# Patient Record
Sex: FEMALE | Race: WHITE | NOT HISPANIC OR LATINO | Employment: FULL TIME | URBAN - METROPOLITAN AREA
[De-identification: names, ages, dates, MRNs, and addresses within clinical notes are randomized per-mention and may not be internally consistent; named-entity substitution may affect disease eponyms.]

---

## 2018-11-12 ENCOUNTER — RECORDS - HEALTHEAST (OUTPATIENT)
Dept: LAB | Facility: CLINIC | Age: 33
End: 2018-11-12

## 2018-11-14 LAB — BACTERIA SPEC CULT: NORMAL

## 2021-10-27 ENCOUNTER — HOSPITAL ENCOUNTER (EMERGENCY)
Facility: CLINIC | Age: 36
Discharge: HOME | End: 2021-10-27
Attending: EMERGENCY MEDICINE | Admitting: EMERGENCY MEDICINE
Payer: COMMERCIAL

## 2021-10-27 ENCOUNTER — APPOINTMENT (OUTPATIENT)
Dept: CT IMAGING | Facility: CLINIC | Age: 36
End: 2021-10-27
Attending: EMERGENCY MEDICINE
Payer: COMMERCIAL

## 2021-10-27 VITALS
HEIGHT: 65 IN | BODY MASS INDEX: 32.49 KG/M2 | OXYGEN SATURATION: 97 % | WEIGHT: 195 LBS | HEART RATE: 81 BPM | SYSTOLIC BLOOD PRESSURE: 140 MMHG | DIASTOLIC BLOOD PRESSURE: 89 MMHG | TEMPERATURE: 98.2 F | RESPIRATION RATE: 18 BRPM

## 2021-10-27 DIAGNOSIS — N20.1 LEFT URETERAL STONE: ICD-10-CM

## 2021-10-27 DIAGNOSIS — N20.0 CALCULUS OF LEFT KIDNEY: ICD-10-CM

## 2021-10-27 DIAGNOSIS — N23 RENAL COLIC: ICD-10-CM

## 2021-10-27 LAB
ALBUMIN UR-MCNC: 20 MG/DL
ANION GAP SERPL CALCULATED.3IONS-SCNC: 13 MMOL/L (ref 5–18)
APPEARANCE UR: CLEAR
BASOPHILS # BLD AUTO: 0.1 10E3/UL (ref 0–0.2)
BASOPHILS NFR BLD AUTO: 1 %
BILIRUB UR QL STRIP: NEGATIVE
BUN SERPL-MCNC: 19 MG/DL (ref 8–22)
C REACTIVE PROTEIN LHE: 0.4 MG/DL (ref 0–0.8)
CALCIUM SERPL-MCNC: 9.5 MG/DL (ref 8.5–10.5)
CHLORIDE BLD-SCNC: 105 MMOL/L (ref 98–107)
CO2 SERPL-SCNC: 19 MMOL/L (ref 22–31)
COLOR UR AUTO: YELLOW
CREAT SERPL-MCNC: 0.87 MG/DL (ref 0.6–1.1)
EOSINOPHIL # BLD AUTO: 0.2 10E3/UL (ref 0–0.7)
EOSINOPHIL NFR BLD AUTO: 1 %
ERYTHROCYTE [DISTWIDTH] IN BLOOD BY AUTOMATED COUNT: 12.6 % (ref 10–15)
GFR SERPL CREATININE-BSD FRML MDRD: 87 ML/MIN/1.73M2
GLUCOSE BLD-MCNC: 108 MG/DL (ref 70–125)
GLUCOSE UR STRIP-MCNC: NEGATIVE MG/DL
HCG UR QL: NEGATIVE
HCT VFR BLD AUTO: 41.4 % (ref 35–47)
HGB BLD-MCNC: 13.7 G/DL (ref 11.7–15.7)
HGB UR QL STRIP: ABNORMAL
IMM GRANULOCYTES # BLD: 0.1 10E3/UL
IMM GRANULOCYTES NFR BLD: 1 %
KETONES UR STRIP-MCNC: ABNORMAL MG/DL
LEUKOCYTE ESTERASE UR QL STRIP: NEGATIVE
LYMPHOCYTES # BLD AUTO: 1.8 10E3/UL (ref 0.8–5.3)
LYMPHOCYTES NFR BLD AUTO: 11 %
MCH RBC QN AUTO: 26.7 PG (ref 26.5–33)
MCHC RBC AUTO-ENTMCNC: 33.1 G/DL (ref 31.5–36.5)
MCV RBC AUTO: 81 FL (ref 78–100)
MONOCYTES # BLD AUTO: 1.5 10E3/UL (ref 0–1.3)
MONOCYTES NFR BLD AUTO: 9 %
MUCOUS THREADS #/AREA URNS LPF: PRESENT /LPF
NEUTROPHILS # BLD AUTO: 13.6 10E3/UL (ref 1.6–8.3)
NEUTROPHILS NFR BLD AUTO: 77 %
NITRATE UR QL: NEGATIVE
NRBC # BLD AUTO: 0 10E3/UL
NRBC BLD AUTO-RTO: 0 /100
PH UR STRIP: 5.5 [PH] (ref 5–7)
PLATELET # BLD AUTO: 343 10E3/UL (ref 150–450)
POTASSIUM BLD-SCNC: 4.1 MMOL/L (ref 3.5–5)
RBC # BLD AUTO: 5.13 10E6/UL (ref 3.8–5.2)
RBC URINE: 108 /HPF
SARS-COV-2 RNA RESP QL NAA+PROBE: NEGATIVE
SODIUM SERPL-SCNC: 137 MMOL/L (ref 136–145)
SP GR UR STRIP: 1.03 (ref 1–1.03)
SQUAMOUS EPITHELIAL: 1 /HPF
UROBILINOGEN UR STRIP-MCNC: <2 MG/DL
WBC # BLD AUTO: 17.3 10E3/UL (ref 4–11)
WBC URINE: 3 /HPF

## 2021-10-27 PROCEDURE — 96375 TX/PRO/DX INJ NEW DRUG ADDON: CPT | Mod: 59

## 2021-10-27 PROCEDURE — 81003 URINALYSIS AUTO W/O SCOPE: CPT | Performed by: EMERGENCY MEDICINE

## 2021-10-27 PROCEDURE — 99285 EMERGENCY DEPT VISIT HI MDM: CPT | Mod: 25

## 2021-10-27 PROCEDURE — 36415 COLL VENOUS BLD VENIPUNCTURE: CPT | Performed by: EMERGENCY MEDICINE

## 2021-10-27 PROCEDURE — 96374 THER/PROPH/DIAG INJ IV PUSH: CPT | Mod: 59

## 2021-10-27 PROCEDURE — 85025 COMPLETE CBC W/AUTO DIFF WBC: CPT | Performed by: EMERGENCY MEDICINE

## 2021-10-27 PROCEDURE — 81025 URINE PREGNANCY TEST: CPT | Performed by: PHYSICIAN ASSISTANT

## 2021-10-27 PROCEDURE — 86141 C-REACTIVE PROTEIN HS: CPT | Performed by: EMERGENCY MEDICINE

## 2021-10-27 PROCEDURE — 250N000011 HC RX IP 250 OP 636: Performed by: EMERGENCY MEDICINE

## 2021-10-27 PROCEDURE — 81001 URINALYSIS AUTO W/SCOPE: CPT | Performed by: EMERGENCY MEDICINE

## 2021-10-27 PROCEDURE — 74177 CT ABD & PELVIS W/CONTRAST: CPT

## 2021-10-27 PROCEDURE — 96376 TX/PRO/DX INJ SAME DRUG ADON: CPT | Mod: 59

## 2021-10-27 PROCEDURE — 80048 BASIC METABOLIC PNL TOTAL CA: CPT | Performed by: EMERGENCY MEDICINE

## 2021-10-27 PROCEDURE — 87635 SARS-COV-2 COVID-19 AMP PRB: CPT | Performed by: FAMILY MEDICINE

## 2021-10-27 PROCEDURE — C9803 HOPD COVID-19 SPEC COLLECT: HCPCS

## 2021-10-27 PROCEDURE — 250N000013 HC RX MED GY IP 250 OP 250 PS 637: Performed by: FAMILY MEDICINE

## 2021-10-27 RX ORDER — PRENATAL VIT/IRON FUM/FOLIC AC 27MG-0.8MG
1 TABLET ORAL EVERY EVENING
COMMUNITY

## 2021-10-27 RX ORDER — HYDROMORPHONE HYDROCHLORIDE 1 MG/ML
0.5 INJECTION, SOLUTION INTRAMUSCULAR; INTRAVENOUS; SUBCUTANEOUS ONCE
Status: COMPLETED | OUTPATIENT
Start: 2021-10-27 | End: 2021-10-27

## 2021-10-27 RX ORDER — IBUPROFEN 200 MG
400 TABLET ORAL EVERY 6 HOURS
Qty: 56 TABLET | Refills: 0 | Status: SHIPPED | OUTPATIENT
Start: 2021-10-27 | End: 2021-11-03

## 2021-10-27 RX ORDER — CETIRIZINE HYDROCHLORIDE 10 MG/1
10 TABLET ORAL EVERY EVENING
COMMUNITY

## 2021-10-27 RX ORDER — OXYCODONE HYDROCHLORIDE 5 MG/1
5 TABLET ORAL EVERY 4 HOURS PRN
Qty: 8 TABLET | Refills: 0 | Status: SHIPPED | OUTPATIENT
Start: 2021-10-27 | End: 2021-10-31

## 2021-10-27 RX ORDER — ONDANSETRON 4 MG/1
4 TABLET, ORALLY DISINTEGRATING ORAL EVERY 6 HOURS PRN
Qty: 12 TABLET | Refills: 0 | Status: SHIPPED | OUTPATIENT
Start: 2021-10-27 | End: 2021-10-30

## 2021-10-27 RX ORDER — ESCITALOPRAM OXALATE 20 MG/1
20 TABLET ORAL EVERY EVENING
COMMUNITY

## 2021-10-27 RX ORDER — IOPAMIDOL 755 MG/ML
100 INJECTION, SOLUTION INTRAVASCULAR ONCE
Status: COMPLETED | OUTPATIENT
Start: 2021-10-27 | End: 2021-10-27

## 2021-10-27 RX ORDER — OXYCODONE HYDROCHLORIDE 5 MG/1
5 TABLET ORAL ONCE
Status: COMPLETED | OUTPATIENT
Start: 2021-10-27 | End: 2021-10-27

## 2021-10-27 RX ORDER — ACETAMINOPHEN 500 MG
1000 TABLET ORAL EVERY 6 HOURS
Qty: 56 TABLET | Refills: 0 | Status: SHIPPED | OUTPATIENT
Start: 2021-10-27 | End: 2021-11-03

## 2021-10-27 RX ORDER — KETOROLAC TROMETHAMINE 15 MG/ML
15 INJECTION, SOLUTION INTRAMUSCULAR; INTRAVENOUS ONCE
Status: COMPLETED | OUTPATIENT
Start: 2021-10-27 | End: 2021-10-27

## 2021-10-27 RX ADMIN — HYDROMORPHONE HYDROCHLORIDE 0.5 MG: 1 INJECTION, SOLUTION INTRAMUSCULAR; INTRAVENOUS; SUBCUTANEOUS at 12:41

## 2021-10-27 RX ADMIN — IOPAMIDOL 100 ML: 755 INJECTION, SOLUTION INTRAVENOUS at 13:37

## 2021-10-27 RX ADMIN — OXYCODONE HYDROCHLORIDE 5 MG: 5 TABLET ORAL at 15:49

## 2021-10-27 RX ADMIN — HYDROMORPHONE HYDROCHLORIDE 0.5 MG: 1 INJECTION, SOLUTION INTRAMUSCULAR; INTRAVENOUS; SUBCUTANEOUS at 14:37

## 2021-10-27 RX ADMIN — KETOROLAC TROMETHAMINE 15 MG: 15 INJECTION, SOLUTION INTRAMUSCULAR; INTRAVENOUS at 12:41

## 2021-10-27 ASSESSMENT — ENCOUNTER SYMPTOMS
DYSURIA: 0
ABDOMINAL PAIN: 1
COUGH: 1
CHILLS: 0
EYES NEGATIVE: 1
SHORTNESS OF BREATH: 0
HEADACHES: 0
FEVER: 0
HEMATURIA: 0
BACK PAIN: 1

## 2021-10-27 ASSESSMENT — MIFFLIN-ST. JEOR: SCORE: 1585.39

## 2021-10-27 NOTE — ED PROVIDER NOTES
EMERGENCY DEPARTMENT ENCOUNTER      NAME: Clementine Noel  AGE: 36 year old female  YOB: 1985  MRN: [id number]  EVALUATION DATE & TIME: 10/27/2021 12:05 PM    PCP: No primary care provider on file.    ED PROVIDER: Miroslava Kate M.D.      CHIEF COMPLAINT     Chief Complaint   Patient presents with     Abdominal Pain       FINAL IMPRESSION:     1. Renal colic    2. Left ureteral stone    3. Calculus of left kidney          MEDICAL DECISION MAKING:     Pertinent Labs & Imaging studies reviewed. (See chart for details)    36 year old female presents to the Emergency Department for evaluation of left sided abdominal pain    She noticed pain about 2 days ago but it went away and today the pain has been constant sometimes it radiates to mid back nothing makes it better nothing makes it worse.  Denies any urinary symptoms never had this pain before.    Called the clinic the daughter to come to the emergency department make sure she does not have an ectopic pregnancy or diverticulitis.    She otherwise denies any constitutional symptoms.    On examination she appears uncomfortable laying in bed her  is with her.  Cardiopulmonary no acute normalities abdominal examination no guarding no rebound unable to reproduce pain.    Differential diagnoses include ectopic diverticulitis perforated viscus left-sided appendicitis kidney stone pyelonephritis among others.    Received Dilaudid for pain and Toradol on repeat evaluation she states she felt much better pregnancy test negative elevated white blood cell count urine with negative nitrates leukocyte esterase but positive red blood cells.    CT does reveal 6 time 4 cm left ureteral stone.    CRP normal.  Call placed to [facility].  Patient reevaluated and updated think pain is better was given another dose of morphine.    ED Course as of Oct 27 1527   Wed Oct 27, 2021   1523 Chloride: 105         Differential Diagnosis (include but not limited to)  Renal colic  appendicitis diverticulitis ectopic perforated viscus dissection among others      Vital Signs: reviewed  EKG: none  Imaging: Reviewed CT 6 x 4 mm left ureteral stone  Home Meds:  ED meds/abx: Toradol Zofran  Fluids: NS    Labs  K 4.1  Cr .87  Wbc 17.3  Hgb 13.7  Platelets 343  CRP 0.4      Review of Previous Records        Consults  [facility]    ED COURSE     12:26 PM I met with the patient for the initial interview and physical examination. Discussed plan for treatment and workup in the ED.      2:02 PM elevated and updated appears much more comfortable.  Pain is 6 out of 10.  Will get another dose of Dilaudid. notified of the 6 x 4 renal stone.      At the conclusion of the encounter I discussed the results of all of the tests and the disposition. The questions were answered. The patient and her  acknowledged understanding and was agreeable with the care plan.           MEDICATIONS GIVEN IN THE EMERGENCY:     Medications   HYDROmorphone (PF) (DILAUDID) injection 0.5 mg (0.5 mg Intravenous Given 10/27/21 1241)   ketorolac (TORADOL) injection 15 mg (15 mg Intravenous Given 10/27/21 1241)   iopamidol (ISOVUE-370) solution 100 mL (100 mLs Intravenous Given 10/27/21 1337)   HYDROmorphone (PF) (DILAUDID) injection 0.5 mg (0.5 mg Intravenous Given 10/27/21 1437)       NEW PRESCRIPTIONS STARTED AT TODAY'S ER VISIT     New Prescriptions    No medications on file     =================================================================    HPI     Patient information was obtained from: Patient    Patient is accompanied by her .     Use of : N/A        Clementine Noel is a 36 year old female with a pertinent medical history of transverse colon benign neoplasm, colorectal polyps, and dyspepsia who presents by walk-in for evaluation of abdominal pain.     The patient reports onset of mild abdominal pain a couple of days ago that is now constant, sharp pain in the LLQ of her abdomen that radiates somewhat to  her back. Patient's  notes that patient has had a cough for the past couple of weeks as well. No concern for pregnancy.     Patient denies dysuria, hematuria, vaginal bleeding, fever, chills, headaches, problems with his eyes, ears, nose, mouth/throat, chest pain, shortness of breath and any other symptoms or complaints at this time.       REVIEW OF SYSTEMS   Review of Systems   Constitutional: Negative for chills and fever.   HENT: Negative.    Eyes: Negative.    Respiratory: Positive for cough. Negative for shortness of breath.    Cardiovascular: Negative for chest pain.   Gastrointestinal: Positive for abdominal pain (LLQ).   Genitourinary: Negative for dysuria, hematuria and vaginal bleeding.   Musculoskeletal: Positive for back pain (radiates from LLQ abdominal pain).   Neurological: Negative for headaches.   All other systems reviewed and are negative.       PAST MEDICAL HISTORY:   No past medical history on file.    PAST SURGICAL HISTORY:   No past surgical history on file.    CURRENT MEDICATIONS:   HYDROcodone-acetaminophen 5-325 mg per tablet       ALLERGIES:     Allergies   Allergen Reactions     Penicillins Rash     States has taken augmentin without problem       FAMILY HISTORY:   No family history on file.    SOCIAL HISTORY:     Social History     Socioeconomic History     Marital status:      Spouse name: Not on file     Number of children: Not on file     Years of education: Not on file     Highest education level: Not on file   Occupational History     Not on file   Tobacco Use     Smoking status: Not on file   Substance and Sexual Activity     Alcohol use: Not on file     Drug use: Not on file     Sexual activity: Not on file   Other Topics Concern     Not on file   Social History Narrative     Not on file     Social Determinants of Health     Financial Resource Strain:      Difficulty of Paying Living Expenses:    Food Insecurity:      Worried About Running Out of Food in the Last  "Year:      Ran Out of Food in the Last Year:    Transportation Needs:      Lack of Transportation (Medical):      Lack of Transportation (Non-Medical):    Physical Activity:      Days of Exercise per Week:      Minutes of Exercise per Session:    Stress:      Feeling of Stress :    Social Connections:      Frequency of Communication with Friends and Family:      Frequency of Social Gatherings with Friends and Family:      Attends Zoroastrianism Services:      Active Member of Clubs or Organizations:      Attends Club or Organization Meetings:      Marital Status:    Intimate Partner Violence:      Fear of Current or Ex-Partner:      Emotionally Abused:      Physically Abused:      Sexually Abused:        VITALS:   /80   Pulse 98   Temp 98.8  F (37.1  C) (Oral)   Resp 17   Ht 1.651 m (5' 5\")   Wt 88.5 kg (195 lb)   LMP 09/15/2020 (Within Weeks)   SpO2 99%   Breastfeeding Yes   BMI 32.45 kg/m      PHYSICAL EXAM     Physical Exam  Vitals and nursing note reviewed. Exam conducted with a chaperone present.   Constitutional:       General: She is in acute distress.      Appearance: She is well-developed and normal weight. She is not ill-appearing, toxic-appearing or diaphoretic.   Cardiovascular:      Rate and Rhythm: Normal rate.   Pulmonary:      Breath sounds: Normal breath sounds.   Abdominal:      General: Abdomen is flat.      Palpations: Abdomen is soft. There is no pulsatile mass.      Tenderness: There is abdominal tenderness in the left lower quadrant. There is no guarding or rebound.   Genitourinary:     Vagina: No tenderness.      Cervix: No cervical motion tenderness.   Skin:     General: Skin is warm.      Capillary Refill: Capillary refill takes less than 2 seconds.   Neurological:      General: No focal deficit present.      Mental Status: She is alert and oriented to person, place, and time.         Physical Exam   Constitutional: Uncomfortable appearing.     Head: Atraumatic.     Nose: Nose " normal.     Mouth/Throat: Oropharynx is clear and moist.     Eyes: EOM are normal. Pupils are equal, round, and reactive to light.     Ears: Bilateral pearly white.     Neck: Normal range of motion. Neck supple.     Cardiovascular: Normal rate, regular rhythm and normal heart sounds.      Pulmonary/Chest: Normal effort  and breath sounds normal.     Abdominal: Soft.  No pain in the left lower quadrant mild tenderness palpation no guarding no rebound    Musculoskeletal: Normal range of motion.     Neurological: No deficit.     Lymphatics: No edema.     Skin: Skin is warm and dry.     Psychiatric: Normal mood and affect. Behavior is normal.       LAB:     All pertinent labs reviewed and interpreted.  Labs Ordered and Resulted from Time of ED Arrival to Time of ED Departure   ROUTINE UA WITH MICROSCOPIC REFLEX TO CULTURE - Abnormal       Result Value    Color Urine Yellow      Appearance Urine Clear      Glucose Urine Negative      Bilirubin Urine Negative      Ketones Urine Trace (*)     Specific Gravity Urine 1.034 (*)     Blood Urine 0.2 mg/dL (*)     pH Urine 5.5      Protein Albumin Urine 20  (*)     Urobilinogen Urine <2.0      Nitrite Urine Negative      Leukocyte Esterase Urine Negative      Mucus Urine Present (*)     RBC Urine 108 (*)     WBC Urine 3      Squamous Epithelials Urine 1     BASIC METABOLIC PANEL - Abnormal    Sodium 137      Potassium 4.1      Chloride 105      Carbon Dioxide (CO2) 19 (*)     Anion Gap 13      Urea Nitrogen 19      Creatinine 0.87      Calcium 9.5      Glucose 108      GFR Estimate 87     CBC WITH PLATELETS AND DIFFERENTIAL - Abnormal    WBC Count 17.3 (*)     RBC Count 5.13      Hemoglobin 13.7      Hematocrit 41.4      MCV 81      MCH 26.7      MCHC 33.1      RDW 12.6      Platelet Count 343      % Neutrophils 77      % Lymphocytes 11      % Monocytes 9      % Eosinophils 1      % Basophils 1      % Immature Granulocytes 1      NRBCs per 100 WBC 0      Absolute Neutrophils  13.6 (*)     Absolute Lymphocytes 1.8      Absolute Monocytes 1.5 (*)     Absolute Eosinophils 0.2      Absolute Basophils 0.1      Absolute Immature Granulocytes 0.1 (*)     Absolute NRBCs 0.0     HCG QUALITATIVE URINE - Normal    hCG Urine Qualitative Negative     CRP INFLAMMATION - Normal    CRP 0.4     COVID-19 VIRUS (CORONAVIRUS) BY PCR        RADIOLOGY:     Reviewed all pertinent imaging. Please see official radiology report.  CT Abdomen Pelvis w Contrast   Final Result   IMPRESSION:    1.  Mild to moderately obstructing 6 x 4 mm stone distal left ureter at the ureterovesical junction.      2.  Additional 3 tiny 1 mm nonobstructing stones lower pole left kidney.      3.  No stones on the right side.           EKG:       I have independently reviewed and interpreted the EKG(s) documented above.      PROCEDURES:     Procedures      I, Dorcas Segundo, am serving as a scribe to document services personally performed by Dr. Kate based on my observation and the provider's statements to me. I, Miroslava Kate MD attest that Dorcas Segundo is acting in a scribe capacity, has observed my performance of the services and has documented them in accordance with my direction.    Miroslava Kate M.D.  Emergency Medicine  [facility] EMERGENCY ROOM  [address] [facility] [id number] [phone]  Dept: [phone]        Miroslava Kate MD  10/27/21 1495

## 2021-10-27 NOTE — DISCHARGE INSTRUCTIONS
Do not eat or drink after midnight than sips of water with medications    [facility] clinic will call you tomorrow for follow-up. If you not hear from them by 10 AM, call the clinic at the number above.

## 2021-10-27 NOTE — Clinical Note
Clementine Noel was seen and treated in our emergency department on 10/27/2021.  She may return to work on 10/29/2021.       If you have any questions or concerns, please don't hesitate to call.      Clifton Abrams MD

## 2021-10-27 NOTE — PHARMACY-ADMISSION MEDICATION HISTORY
Pharmacy Note - Admission Medication History    Pertinent Provider Information: None. ______________________________________________________________________    Prior To Admission (PTA) med list completed and updated in EMR.       PTA Med List   Medication Sig Last Dose     cetirizine (ZYRTEC) 10 MG tablet Take 10 mg by mouth every evening 10/26/2021 at Unknown time     escitalopram (LEXAPRO) 20 MG tablet Take 20 mg by mouth every evening 10/26/2021 at Unknown time     Prenatal Vit-Fe Fumarate-FA (PRENATAL MULTIVITAMIN W/IRON) 27-0.8 MG tablet Take 1 tablet by mouth every evening 10/26/2021 at Unknown time     Information source(s): Patient and CareEverywhere/SureScripts  Method of interview communication: in-person    Summary of Changes to PTA Med List  New: None  Discontinued: None  Changed: None    Patient was asked about OTC/herbal products specifically.  PTA med list reflects this.    In the past week, patient estimated taking medication this percent of the time:  greater than 90%.    Allergies were reviewed, assessed, and updated with the patient.      Patient does not use any multi-dose medications prior to admission.    The information provided in this note is only as accurate as the sources available at the time of the update(s).    Thank you for the opportunity to participate in the care of this patient.    Emerita Christine, PharmD, BCPS  10/27/2021 4:25 PM

## 2021-10-27 NOTE — ED PROVIDER NOTES
"ED SIGNOUT  Date/Time:10/27/2021 2:29 PM    ED Course/MDM:    2:29 PM Signout accepted from Dr. Miroslava Kate.  Prior records were reviewed.  Labs, x-ray, CT, EKG from this visit are reviewed.    3:03 PM Spoke with Dr. Goyal ***  3:32 PM Introduced myself to the patient.   4:42 PM Checked in on and updated patient. She reports being pain free and agreeable with discharge.    At the conclusion of the encounter I discussed  the results of all of the tests and the disposition with ***.   All questions were answered.  The *** acknowledged understanding and was involved in the decision making regarding the overall care plan.     I discussed with patient the utility, limitations and findings of the exam/interventions/studies done during this visit as well as the list of differential diagnosis and symptoms to monitor/return to ER for.  Additional verbal discharge instructions were provided.     DIAGNOSIS  No diagnosis found.    New Prescriptions    No medications on file       HPI    Briefly, this is a 36 year old female who presents for evaluation of constant, sharp LLQ abdominal pain. Reports that mild abdominal pain started a couple days ago but has now worsened and occasionally radiates to her back. Patient's  notes that patient has had a cough for the past couple of weeks as well. No concern for pregnancy.     Physical Exam:  /80   Pulse 98   Temp 98.8  F (37.1  C) (Oral)   Resp 17   Ht 1.651 m (5' 5\")   Wt 88.5 kg (195 lb)   LMP 09/15/2020 (Within Weeks)   SpO2 99%   Breastfeeding Yes   BMI 32.45 kg/m    Physical Exam ***     Results for orders placed or performed during the hospital encounter of 10/27/21   CT Abdomen Pelvis w Contrast    Impression    IMPRESSION:   1.  Mild to moderately obstructing 6 x 4 mm stone distal left ureter at the ureterovesical junction.    2.  Additional 3 tiny 1 mm nonobstructing stones lower pole left kidney.    3.  No stones on the right side.   UA with " Microscopic reflex to Culture    Specimen: Urine, Clean Catch   Result Value Ref Range    Color Urine Yellow Colorless, Straw, Light Yellow, Yellow    Appearance Urine Clear Clear    Glucose Urine Negative Negative mg/dL    Bilirubin Urine Negative Negative    Ketones Urine Trace (A) Negative mg/dL    Specific Gravity Urine 1.034 (H) 1.001 - 1.030    Blood Urine 0.2 mg/dL (A) Negative    pH Urine 5.5 5.0 - 7.0    Protein Albumin Urine 20  (A) Negative mg/dL    Urobilinogen Urine <2.0 <2.0 mg/dL    Nitrite Urine Negative Negative    Leukocyte Esterase Urine Negative Negative    Mucus Urine Present (A) None Seen /LPF    RBC Urine 108 (H) <=2 /HPF    WBC Urine 3 <=5 /HPF    Squamous Epithelials Urine 1 <=1 /HPF   HCG qualitative urine   Result Value Ref Range    hCG Urine Qualitative Negative Negative   Basic metabolic panel   Result Value Ref Range    Sodium 137 136 - 145 mmol/L    Potassium 4.1 3.5 - 5.0 mmol/L    Chloride 105 98 - 107 mmol/L    Carbon Dioxide (CO2) 19 (L) 22 - 31 mmol/L    Anion Gap 13 5 - 18 mmol/L    Urea Nitrogen 19 8 - 22 mg/dL    Creatinine 0.87 0.60 - 1.10 mg/dL    Calcium 9.5 8.5 - 10.5 mg/dL    Glucose 108 70 - 125 mg/dL    GFR Estimate 87 >60 mL/min/1.73m2   CBC with platelets and differential   Result Value Ref Range    WBC Count 17.3 (H) 4.0 - 11.0 10e3/uL    RBC Count 5.13 3.80 - 5.20 10e6/uL    Hemoglobin 13.7 11.7 - 15.7 g/dL    Hematocrit 41.4 35.0 - 47.0 %    MCV 81 78 - 100 fL    MCH 26.7 26.5 - 33.0 pg    MCHC 33.1 31.5 - 36.5 g/dL    RDW 12.6 10.0 - 15.0 %    Platelet Count 343 150 - 450 10e3/uL    % Neutrophils 77 %    % Lymphocytes 11 %    % Monocytes 9 %    % Eosinophils 1 %    % Basophils 1 %    % Immature Granulocytes 1 %    NRBCs per 100 WBC 0 <1 /100    Absolute Neutrophils 13.6 (H) 1.6 - 8.3 10e3/uL    Absolute Lymphocytes 1.8 0.8 - 5.3 10e3/uL    Absolute Monocytes 1.5 (H) 0.0 - 1.3 10e3/uL    Absolute Eosinophils 0.2 0.0 - 0.7 10e3/uL    Absolute Basophils 0.1 0.0 - 0.2  10e3/uL    Absolute Immature Granulocytes 0.1 (H) <=0.0 10e3/uL    Absolute NRBCs 0.0 10e3/uL       CT Abdomen Pelvis w Contrast    Result Date: 10/27/2021  EXAM: CT ABDOMEN PELVIS W CONTRAST LOCATION: [facility] DATE/TIME: 10/27/2021 1:33 PM INDICATION: Flank pain, stone disease suspected - left COMPARISON: None. TECHNIQUE: CT scan of the abdomen and pelvis was performed following injection of IV contrast. Multiplanar reformats were obtained. Dose reduction techniques were used. CONTRAST: Isovue-370 100mL FINDINGS: LOWER CHEST: Normal. HEPATOBILIARY: Normal. PANCREAS: Normal. SPLEEN: Normal. ADRENAL GLANDS: Normal. KIDNEYS/BLADDER: 6 x 4 mm mild to moderately obstructing stone distal left ureter at ureterovesical junction. Additional 3 tiny 1 mm nonobstructing stones lower pole left kidney. No stones on the right side. BOWEL: Normal. LYMPH NODES: Normal. VASCULATURE: Unremarkable. PELVIC ORGANS: Normal. MUSCULOSKELETAL: Normal.     IMPRESSION: 1.  Mild to moderately obstructing 6 x 4 mm stone distal left ureter at the ureterovesical junction. 2.  Additional 3 tiny 1 mm nonobstructing stones lower pole left kidney. 3.  No stones on the right side.      Clifton Abrams M.D.  [facility] Emergency Department

## 2021-10-27 NOTE — ED TRIAGE NOTES
Pt. Reports acute onset of severe LLQ pain this morning, with some nausea. Pt. Very uncomfortable/restless d/t pain currently. Pt. Seen in urgent care, but sent to ED for further evaluation of possible ectopic vs diverticulitis.

## 2021-10-28 ENCOUNTER — TELEPHONE (OUTPATIENT)
Dept: UROLOGY | Facility: CLINIC | Age: 36
End: 2021-10-28

## 2021-10-28 ENCOUNTER — VIRTUAL VISIT (OUTPATIENT)
Dept: UROLOGY | Facility: CLINIC | Age: 36
End: 2021-10-28
Payer: COMMERCIAL

## 2021-10-28 DIAGNOSIS — N20.1 LEFT URETERAL STONE: ICD-10-CM

## 2021-10-28 DIAGNOSIS — N20.1 CALCULUS OF URETER: Primary | ICD-10-CM

## 2021-10-28 PROCEDURE — 99204 OFFICE O/P NEW MOD 45 MIN: CPT | Mod: 95 | Performed by: UROLOGY

## 2021-10-28 ASSESSMENT — PAIN SCALES - GENERAL: PAINLEVEL: NO PAIN (1)

## 2021-10-28 NOTE — TELEPHONE ENCOUNTER
Message left for patient to call clinic to set up an appointment for kidney stone follow-up for today or tomorrow.  Venus Hobbs RN

## 2021-10-28 NOTE — PATIENT INSTRUCTIONS
Patient Stated Goal: Pass my stone  Symptom Control While Passing A Stone    The goal of [facility] is to let a smaller kidney stone (less than 4 to 5 mm) pass without intervention if possible. Giving your body a chance to clear the stone may take a few hours up to a few weeks.  Keeping you well-informed, safe and fairly comfortable is important.    Drink to thirst  Do not attempt to  flush out  your stone by drinking too much fluid. This does not work and may increase nausea. Drink enough to satisfy your body s thirst. Eating your normal diet is fine.   Medications (that may be suggested or prescribed)    Ibuprofen (Advil or Motrin) Available over the counter  o Take two (200mg) tablets every six hours until the stone passes.  o Prevents spasm of the ureter.    o Decreases pain.      Dramamine* (drowsy version, non-generic formulation) Available over the counter  o Take 50mg at bedtime  o Decreases spasms of the ureter  o Decreases nausea  o Decreases acute pain  o Decreases recurrence of pain for 24 hours  o Will help you sleep  *This medication will cause increase drowsiness, do not drive or operate machinery for 6 hours.      Narcotics (Percocet, Vicodin, Dilaudid) Take as prescribed for severe pain unrelieved by ibuprofen and Dramamine  o Narcotics have significant side effects and only  cover-up  pain. They have no effect on the cause of pain.  o Common side effects  - Confusion, disorientation and sedation - DO NOT DRIVE OR OPERATE MACHINERY WITHIN 24 HOURS  - Nausea - take Dramamine or Zofran or Haldol to help control  - Constipation  - Sleep disturbances      Ondansetron (Zofran) Take as prescribed  o Reserve for severe nausea  o May cause constipation, start over the counter Miralax if needed      Second Line Anti-Nausea Medication: Adding a different anti-nausea medication maybe helpful for persistent nausea.  The combined effect of different types of anti-nausea medications maybe more  effective than either medication by itself, even in higher doses.  o Compazine: Take as prescribed      Information about kidney stones    Crystals can form if chemicals are too concentrated in your urine. If the crystal grows over time, a stone may form. A stone usually isn t painful while it is still in the kidney.    As the stone begins to leave the kidney, you may experience episodes of flank pain as the kidney stone approaches the entrance to the ureter. Some people feel a vague ache in the side.    Kidney stones may fall into the ureter. Some stones are tiny and pass through without causing symptoms. The ureter is a small tube (approximately 1/8 of an inch wide). A kidney stone can get stuck and block the ureter. If this happens, urine backs up and flows back to the kidney. Back pressure on the kidney can cause:  o Severe flank pain radiating to the groin.  o Severe nausea and vomiting.  o The pain can occur in the lower back, side, groin or all three.      When the stone reaches the lower ureter, this can irritate the bladder and sensations of feeling the urge to urinate frequently and urgently may occur.      Once the stone passes out of your ureter and into your bladder, the symptoms of urgency and frequency will often disappear. Sometimes pain will come back for a short period and will not be as severe as before. The passage of the stone from your bladder and out of your body is usually not a problem. The urethra is at least twice as wide as the normal ureter, so the stone doesn t usually block it.    Strain all urine  If you pass the stone, save it. Place it in the container we have provided and bring it to the [facility] within a week of passing it. Your stone will then be sent for analysis which takes about a month.     Signs and symptoms you might experience    Nausea    Decreased appetite    Urinary frequency    Bloody urine     Chills    Fatigue    When to call [facility] or  go to the Emergency Room    Fever with a temperature greater than 100.1    Severe pain    Persistent nausea/vomiting    If the pain worsens or nausea/vomiting is uncontrolled with medications, STOP eating & drinking. You need to have an empty stomach for 8 hours prior to surgery. Call the [facility] immediately at [phone].           Follow-up    Low dose CT scan with doctor visit 1-2 weeks after initial clinic visit per doctor s instructions    Please cancel the CT scan visit if you pass a stone. Reschedule for a one month follow-up with doctor to discuss stone composition and future prevention.    Preventing future stones    Approximately a month after your stone is sent out for analysis, a prevention visit will occur with your provider, to discuss an individualized plan for prevention of new stones and to discuss managing stones that you may still have. Along with the analysis of the kidney stone, blood and urine tests may be indicated to develop this plan. Knowing the type of kidney stones you make, and why, allows the providers at the [facility] to recommend specific ways to prevent them.    Follow-up visits are an important part of monitoring and preventing future re-occurrences.    The [facility] is available for questions or concerns 24 hours a day at [phone]

## 2021-10-28 NOTE — PROGRESS NOTES
Assessment/Plan:    Assessment & Plan   Diagnoses and all orders for this visit:    Calculus of ureter  -     Patient Stated Goal: Pass my stone  -     CT Abdomen Pelvis w/o Contrast; Future        Stone Management Plan  Stone Management 10/28/2021   Urinary Tract Infection No suspicion of infection   Renal Colic Well controlled symptoms   Renal Failure No suspicion of renal failure   Current CT date 10/27/2021   Right sided stones? No   R Stone Event No current event   Left sided stones? Yes   L Number of ureteral stones 1   L GSD of ureteral stones 6   L Location of ureteral stone Distal   L Number of kidney stones  3   L GSD of kidney stones < 2   L Hydronephrosis Mild   L Stone Event New event   Diagnosis date 10/27/2021   Initial location of primary symptomatic stone Distal   Initial GSD of primary symptomatic stone 6   L MET Status Initiation   L Current Plan MET   MET 2 week F/U             PLAN    Will proceed with medical expulsive therapy. Risks and benefits were detailed of medical expulsive therapy including probability of stone passage, recurrent renal colic, and requirement of emergency medical and/or surgical care and further imaging. Patient verbalized understanding. Patient agrees with plan as discussed. She will return in 2 weeks with low dose CT scan.    Over the counter symptom control medications of ibuprofen, Dramamine and Tylenol were recommended.    Phone call duration: 10 minutes  15 minutes spent on the date of the encounter doing chart review, history and exam, documentation and further activities per the note    SARAH SOLIS MD  [facility]    Subjective:     HPI  Ms. Clementine Noel is a 36 year old  female who is being evaluated via a billable video visit by [facility] [place] new stone episode.    She is a first time unidentified composition stone former who has not required stone clearance procedures. She has  not previously participated in stone risk evaluation. She has no identified modifiable stone risk factors. She has identified non-modifiable stone risks including:  limited family history and multiple stones at presentation.    She was into ED yesterday with severe left flank pain. Good pain control since. No fever or chills. Denies voiding symptoms. Had some left sided cramping a couple of days earlier. Several maternal aunts have had stone issues.    CT scan is personally reviewed and demonstrates a 6 mm left distal stone and ~3 left lower pole papillary tip calcifications.    Significant labs from presentation include elevated WBC and normal C reactive protein.    ROS   Review of systems is negative except for HPI    No past medical history on file.    No past surgical history on file.    Current Outpatient Medications   Medication Sig Dispense Refill     acetaminophen (TYLENOL) 500 MG tablet Take 2 tablets (1,000 mg) by mouth every 6 hours for 7 days 56 tablet 0     cetirizine (ZYRTEC) 10 MG tablet Take 10 mg by mouth every evening       escitalopram (LEXAPRO) 20 MG tablet Take 20 mg by mouth every evening       ibuprofen (ADVIL/MOTRIN) 200 MG tablet Take 2 tablets (400 mg) by mouth every 6 hours for 7 days 56 tablet 0     ondansetron (ZOFRAN-ODT) 4 MG ODT tab Take 1 tablet (4 mg) by mouth every 6 hours as needed for nausea 12 tablet 0     oxyCODONE (ROXICODONE) 5 MG tablet Take 1 tablet (5 mg) by mouth every 4 hours as needed for severe pain If pain is not improved with acetaminophen and ibuprofen. 8 tablet 0     Prenatal Vit-Fe Fumarate-FA (PRENATAL MULTIVITAMIN W/IRON) 27-0.8 MG tablet Take 1 tablet by mouth every evening         Allergies   Allergen Reactions     Penicillins Rash     Rash was as a child. States has taken augmentin without problem.       Social History     Socioeconomic History     Marital status:      Spouse name: Not on file     Number of children: Not on file     Years of education:  Not on file     Highest education level: Not on file   Occupational History     Not on file   Tobacco Use     Smoking status: Not on file   Substance and Sexual Activity     Alcohol use: Not on file     Drug use: Not on file     Sexual activity: Not on file   Other Topics Concern     Not on file   Social History Narrative     Not on file     Social Determinants of Health     Financial Resource Strain:      Difficulty of Paying Living Expenses:    Food Insecurity:      Worried About Running Out of Food in the Last Year:      Ran Out of Food in the Last Year:    Transportation Needs:      Lack of Transportation (Medical):      Lack of Transportation (Non-Medical):    Physical Activity:      Days of Exercise per Week:      Minutes of Exercise per Session:    Stress:      Feeling of Stress :    Social Connections:      Frequency of Communication with Friends and Family:      Frequency of Social Gatherings with Friends and Family:      Attends Caodaism Services:      Active Member of Clubs or Organizations:      Attends Club or Organization Meetings:      Marital Status:    Intimate Partner Violence:      Fear of Current or Ex-Partner:      Emotionally Abused:      Physically Abused:      Sexually Abused:        No family history on file.    Objective:     No vitals or physical exam obtained due to virtual visit    Labs    Urinalysis POC (Office):  Nitrite Urine   Date Value Ref Range Status   10/27/2021 Negative Negative Final       Lab Urinalysis:  Nitrite Urine   Date Value Ref Range Status   10/27/2021 Negative Negative Final    and Acute Labs   CBC   WBC Count   Date Value Ref Range Status   10/27/2021 17.3 (H) 4.0 - 11.0 10e3/uL Final     Hemoglobin   Date Value Ref Range Status   10/27/2021 13.7 11.7 - 15.7 g/dL Final     Platelet Count   Date Value Ref Range Status   10/27/2021 343 150 - 450 10e3/uL Final   , C Reactive Protein    CRP   Date Value Ref Range Status   10/27/2021 0.4 0.0-<0.8 mg/dL Final   , Renal  Panel  KSINo results found for: CREATININE and Urine Culture    Culture   Date Value Ref Range Status   11/12/2018 Usual Erika  Final

## 2021-10-28 NOTE — PROGRESS NOTES
Patient states that they are in [place] at the time of their visit.  Patient is aware telehealth visit is billable and agrees to proceed.  Venus Hobbs RN

## 2021-11-01 DIAGNOSIS — N20.1 CALCULUS OF URETER: Primary | ICD-10-CM

## 2021-11-02 ENCOUNTER — LAB (OUTPATIENT)
Dept: LAB | Facility: CLINIC | Age: 36
End: 2021-11-02
Payer: COMMERCIAL

## 2021-11-02 DIAGNOSIS — N20.1 CALCULUS OF URETER: ICD-10-CM

## 2021-11-02 PROCEDURE — 82365 CALCULUS SPECTROSCOPY: CPT

## 2021-11-06 LAB
APPEARANCE STONE: NORMAL
COMPN STONE: NORMAL
NUMBER STONE: 1
SIZE STONE: NORMAL MM
SPECIMEN WT: 47 MG

## 2021-11-11 ENCOUNTER — VIRTUAL VISIT (OUTPATIENT)
Dept: UROLOGY | Facility: CLINIC | Age: 36
End: 2021-11-11
Payer: COMMERCIAL

## 2021-11-11 ENCOUNTER — TELEPHONE (OUTPATIENT)
Dept: UROLOGY | Facility: CLINIC | Age: 36
End: 2021-11-11

## 2021-11-11 DIAGNOSIS — N20.0 CALCULUS OF KIDNEY: Primary | ICD-10-CM

## 2021-11-11 PROCEDURE — 99213 OFFICE O/P EST LOW 20 MIN: CPT | Mod: 95 | Performed by: PHYSICIAN ASSISTANT

## 2021-11-11 ASSESSMENT — PAIN SCALES - GENERAL: PAINLEVEL: NO PAIN (0)

## 2021-11-11 NOTE — PROGRESS NOTES
Assessment/Plan:    Assessment & Plan   Clementine was seen today for follow up.    Diagnoses and all orders for this visit:    Calculus of kidney  -     Parathyroid Hormone Intact; Future  -     Ionized Calcium; Future  -     Vitamin D  25-Hydroxy ([facility]); Future  -     Patient Stated Goal: Prevent further stones    Stone Management Plan  Stone Management 10/28/2021 11/11/2021   Urinary Tract Infection No suspicion of infection No suspicion of infection   Renal Colic Well controlled symptoms Asymptomatic at this time   Renal Failure No suspicion of renal failure No suspicion of renal failure   Current CT date 10/27/2021 -   Right sided stones? No -   R Stone Event No current event No current event   Left sided stones? Yes -   L Number of ureteral stones 1 -   L GSD of ureteral stones 6 -   L Location of ureteral stone Distal -   L Number of kidney stones  3 -   L GSD of kidney stones < 2 -   L Hydronephrosis Mild -   L Stone Event New event Resolved event   Diagnosis date 10/27/2021 -   Initial location of primary symptomatic stone Distal -   Initial GSD of primary symptomatic stone 6 -   Resolved date - 11/1/2021   L MET Status Initiation Passed   L Current Plan MET Observe   MET 2 week F/U -   Observe rationale - Limited stone burden with good prognosis for spontaneous passage         PLAN    37 yo F ~ 5 months postpartum, first time calcium based stone former with interval passage of left distal ureteral stone. Known, left renal papillary tip calcifications.    She is congratulated on passing her stone and will proceed with stone risk evaluation. Will obtain serum chemistries and two 24 hour urine collections.    Video call duration: 10 minutes  15 minutes spent on the date of the encounter doing chart review, history and exam, documentation and further activities per the note    Cathie Ramirez PA-C  [facility]    HPI  Ms. Clementine Noel is a 36 year old  female  who is being evaluated via a billable video visit by [facility] [place] for medical expulsive therapy follow up.     On last encounter, her 6 mm stone was in left distal ureter with mild hydronephrosis. She has had no unanticipated events.    Symptoms have been absent since passing the stone. She had a healthy baby girl 5 months ago and plans to continue nursing for next several months. She denies symptoms of fever, chills, flank pain, nausea, vomiting, urinary frequency and dysuria.     Imaging was not performed today because she has passed stone and captured for analysis.     Stone analysis from 11/1/21 is 80% CaP (apatite) and 20% CaOx.    ROS   Review of systems is negative except for HPI.    No past medical history on file.    No past surgical history on file.    Current Outpatient Medications   Medication Sig Dispense Refill     cetirizine (ZYRTEC) 10 MG tablet Take 10 mg by mouth every evening       escitalopram (LEXAPRO) 20 MG tablet Take 20 mg by mouth every evening       Prenatal Vit-Fe Fumarate-FA (PRENATAL MULTIVITAMIN W/IRON) 27-0.8 MG tablet Take 1 tablet by mouth every evening       Allergies   Allergen Reactions     Penicillins Rash     Rash was as a child. States has taken augmentin without problem.       Social History     Socioeconomic History     Marital status:      Spouse name: Not on file     Number of children: Not on file     Years of education: Not on file     Highest education level: Not on file   Occupational History     Not on file   Tobacco Use     Smoking status: Not on file     Smokeless tobacco: Not on file   Substance and Sexual Activity     Alcohol use: Not on file     Drug use: Not on file     Sexual activity: Not on file   Other Topics Concern     Not on file   Social History Narrative     Not on file     Social Determinants of Health     Financial Resource Strain: Not on file   Food Insecurity: Not on file   Transportation Needs: Not on file    Physical Activity: Not on file   Stress: Not on file   Social Connections: Not on file   Intimate Partner Violence: Not on file   Housing Stability: Not on file       No family history on file.    Objective:     Appears AAO x 3  No vitals obtained due to virtual visit    Labs   Most Recent 3 BMP's:Recent Labs   Lab Test 10/27/21  1239      POTASSIUM 4.1   CHLORIDE 105   CO2 19*   BUN 19   CR 0.87   ANIONGAP 13   YULIA 9.5

## 2021-11-11 NOTE — PROGRESS NOTES
Patient is roomed via telephone for a virtual visit.  Patient confirmed she is in the [facility] at the time of this appointment.  Patient understands that this virtual visit is billable and agree to proceed with appointment.

## 2021-11-11 NOTE — PATIENT INSTRUCTIONS
Patient Stated Goal: Prevent further stones  Steps for collecting a 24 hour urine specimen    Please follow the directions carefully. All urine voided for a 24-hour period needs to be collected into the jug.  DO NOT change any of your  normal  daily habits when doing this test. Continue to follow your regular diet, intake of fluids, and usual activity level. Pick the most convenient day with your schedule, perhaps on a weekend or a day off.    Start your Diet Log the day before collection and continue on the day of urine collection.  You MUST bring Diet Log with you on follow up visit to discuss results.    One 24hr Urine Collection     Two 24hr Urine Collections  (do not collect on consecutive days)    PLEASE COMPLETE THE 2nd JUG WITHIN 1-2 WEEKS FROM THE 1st JUG    STEP 1  Empty your bladder completely into the toilet. This will be your start time. Write your full legal name, start date and time on the jug label.  Collection start and stop times need to match exactly!  For example:  6 am to 6 am.    STEP 2  The next time you urinate, empty your bladder directly into the jug or collection hat and pour urine into the jug.  Screw the lid back onto the jug.  Do not spill!    STEP 3  Place the jug in the refrigerator or a cooler with ice during the collection period.  Failure to keep it cool could cause inaccurate test results. DO NOT Freeze.    STEP 4  Continue collecting all urine into the jug for the rest of the day, for the full 24 hours.  DO NOT stop early or go over 24 hours!    STEP 5  Exactly 24 hours from start of collection, write your full legal name, stop date and time on the jug label.   Collection start and stop times need to match exactly!  For example:  6 am to 6 am.  Failure to label correctly will result in recollection of urine specimen.    STEP 6  Return each jug within 24 hours after final urination.     STEP 7  Drop off jug locations:   Mail back as instructed    STEP 8  Please call [facility] after  return of your final jug to schedule your follow-up visit. [phone]

## 2021-12-11 ENCOUNTER — HEALTH MAINTENANCE LETTER (OUTPATIENT)
Age: 36
End: 2021-12-11

## 2021-12-21 ENCOUNTER — VIRTUAL VISIT (OUTPATIENT)
Dept: FAMILY MEDICINE | Facility: CLINIC | Age: 36
End: 2021-12-21
Payer: COMMERCIAL

## 2021-12-21 DIAGNOSIS — J02.9 ACUTE PHARYNGITIS, UNSPECIFIED ETIOLOGY: Primary | ICD-10-CM

## 2021-12-21 PROCEDURE — 99213 OFFICE O/P EST LOW 20 MIN: CPT | Mod: 95 | Performed by: FAMILY MEDICINE

## 2021-12-21 RX ORDER — CEFDINIR 300 MG/1
300 CAPSULE ORAL 2 TIMES DAILY
Qty: 14 CAPSULE | Refills: 0 | Status: SHIPPED | OUTPATIENT
Start: 2021-12-21 | End: 2021-12-28

## 2021-12-21 NOTE — PROGRESS NOTES
"Clementine is a 36 year old who is being evaluated via a billable video visit.      How would you like to obtain your AVS? MyChart  If the video visit is dropped, the invitation should be resent by: Text to cell phone: 1  Will anyone else be joining your video visit? No      Video Start Time: 1725    Assessment & Plan     Acute pharyngitis, unspecified etiology  1 day worth of sore throat.  Patient says when looking in it it appears more red and tonsils are a bit swollen but no pus.  I note that her voice is a bit throaty.  Really no other illness symptoms including no fever or chills or significant body aches.  Recently had some cold symptoms and was treated with Zithromax for a sinus infection, but she is not having any upper respiratory symptoms otherwise.  So really seems to be centered in her throat.  Is still nursing.  No known ill contacts.  I think it is worthwhile treating with a course of a broader spectrum antibiotic that would be safe for nursing.  - cefdinir (OMNICEF) 300 MG capsule; Take 1 capsule (300 mg) by mouth 2 times daily for 7 days    Reviewed recent history with patient and in her chart.     BMI:   Estimated body mass index is 32.45 kg/m  as calculated from the following:    Height as of 10/27/21: 1.651 m (5' 5\").    Weight as of 10/27/21: 88.5 kg (195 lb).       See Patient Instructions    Return in about 3 days (around 12/24/2021) for If not improving as expected, [employer] message.    Kristen Ponce MD  [facility]   Clementine is a 36 year old who presents for the following health issues     HPI       Video visit with patient today with 1 day worth of sore throat.    Review of Systems   Constitutional, HEENT, cardiovascular, pulmonary, gi and gu systems are negative, except as otherwise noted.      Objective           Vitals:  No vitals were obtained today due to virtual visit.    Physical Exam   GENERAL: Healthy, alert and no distress  EYES: Eyes " grossly normal to inspection.  No discharge or erythema, or obvious scleral/conjunctival abnormalities.  RESP: No audible wheeze, cough, or visible cyanosis.  No visible retractions or increased work of breathing.    SKIN: Visible skin clear. No significant rash, abnormal pigmentation or lesions.  NEURO: Cranial nerves grossly intact.  Mentation and speech appropriate for age.  PSYCH: Mentation appears normal, affect normal/bright, judgement and insight intact, normal speech and appearance well-groomed.    Past labs reviewed with the patient.             Video-Visit Details    Type of service:  Video Visit    Video End Time:1730    Originating Location (pt. Location): Home    Distant Location (provider location):  [facility] [place] [facility]     Platform used for Video Visit: BetUknow

## 2022-10-01 ENCOUNTER — HEALTH MAINTENANCE LETTER (OUTPATIENT)
Age: 37
End: 2022-10-01

## 2022-11-03 LAB
ABO (EXTERNAL): NORMAL
HEPATITIS B SURFACE ANTIGEN (EXTERNAL): NONREACTIVE
HIV1+2 AB SERPL QL IA: NONREACTIVE
RH (EXTERNAL): POSITIVE
RUBELLA ANTIBODY IGG (EXTERNAL): NORMAL
TREPONEMA PALLIDUM ANTIBODY (EXTERNAL): NONREACTIVE

## 2023-02-04 ENCOUNTER — HEALTH MAINTENANCE LETTER (OUTPATIENT)
Age: 38
End: 2023-02-04

## 2023-05-16 ENCOUNTER — ANESTHESIA EVENT (OUTPATIENT)
Dept: OBGYN | Facility: CLINIC | Age: 38
End: 2023-05-16
Payer: COMMERCIAL

## 2023-05-16 ENCOUNTER — HOSPITAL ENCOUNTER (INPATIENT)
Facility: CLINIC | Age: 38
LOS: 1 days | Discharge: ANOTHER HOSPITAL | End: 2023-05-16
Attending: ADVANCED PRACTICE MIDWIFE | Admitting: ADVANCED PRACTICE MIDWIFE
Payer: COMMERCIAL

## 2023-05-16 ENCOUNTER — ANESTHESIA (OUTPATIENT)
Dept: OBGYN | Facility: CLINIC | Age: 38
End: 2023-05-16
Payer: COMMERCIAL

## 2023-05-16 ENCOUNTER — HOSPITAL ENCOUNTER (INPATIENT)
Facility: CLINIC | Age: 38
LOS: 2 days | Discharge: HOME HEALTH | End: 2023-05-18
Attending: OBSTETRICS & GYNECOLOGY | Admitting: OBSTETRICS & GYNECOLOGY
Payer: COMMERCIAL

## 2023-05-16 VITALS
TEMPERATURE: 97.8 F | RESPIRATION RATE: 16 BRPM | SYSTOLIC BLOOD PRESSURE: 134 MMHG | WEIGHT: 216 LBS | HEIGHT: 64 IN | BODY MASS INDEX: 36.88 KG/M2 | DIASTOLIC BLOOD PRESSURE: 75 MMHG | OXYGEN SATURATION: 100 % | HEART RATE: 88 BPM

## 2023-05-16 PROBLEM — Z34.90 PREGNANT: Status: ACTIVE | Noted: 2023-05-16

## 2023-05-16 PROCEDURE — 88307 TISSUE EXAM BY PATHOLOGIST: CPT | Mod: TC | Performed by: ADVANCED PRACTICE MIDWIFE

## 2023-05-16 PROCEDURE — 250N000011 HC RX IP 250 OP 636: Performed by: ANESTHESIOLOGY

## 2023-05-16 PROCEDURE — 258N000003 HC RX IP 258 OP 636: Performed by: ADVANCED PRACTICE MIDWIFE

## 2023-05-16 PROCEDURE — 0UQMXZZ REPAIR VULVA, EXTERNAL APPROACH: ICD-10-PCS | Performed by: ADVANCED PRACTICE MIDWIFE

## 2023-05-16 PROCEDURE — 250N000013 HC RX MED GY IP 250 OP 250 PS 637: Performed by: ADVANCED PRACTICE MIDWIFE

## 2023-05-16 PROCEDURE — 120N000001 HC R&B MED SURG/OB

## 2023-05-16 PROCEDURE — 250N000013 HC RX MED GY IP 250 OP 250 PS 637: Performed by: STUDENT IN AN ORGANIZED HEALTH CARE EDUCATION/TRAINING PROGRAM

## 2023-05-16 PROCEDURE — 250N000011 HC RX IP 250 OP 636: Performed by: ADVANCED PRACTICE MIDWIFE

## 2023-05-16 PROCEDURE — 0KQM0ZZ REPAIR PERINEUM MUSCLE, OPEN APPROACH: ICD-10-PCS | Performed by: ADVANCED PRACTICE MIDWIFE

## 2023-05-16 PROCEDURE — 88307 TISSUE EXAM BY PATHOLOGIST: CPT | Mod: 26 | Performed by: PATHOLOGY

## 2023-05-16 PROCEDURE — 120N000002 HC R&B MED SURG/OB UMMC

## 2023-05-16 PROCEDURE — 3E0R3BZ INTRODUCTION OF ANESTHETIC AGENT INTO SPINAL CANAL, PERCUTANEOUS APPROACH: ICD-10-PCS | Performed by: ANESTHESIOLOGY

## 2023-05-16 PROCEDURE — 250N000009 HC RX 250: Performed by: ADVANCED PRACTICE MIDWIFE

## 2023-05-16 PROCEDURE — 722N000001 HC LABOR CARE VAGINAL DELIVERY SINGLE

## 2023-05-16 PROCEDURE — 370N000003 HC ANESTHESIA WARD SERVICE: Performed by: ANESTHESIOLOGY

## 2023-05-16 PROCEDURE — 00HU33Z INSERTION OF INFUSION DEVICE INTO SPINAL CANAL, PERCUTANEOUS APPROACH: ICD-10-PCS | Performed by: ANESTHESIOLOGY

## 2023-05-16 RX ORDER — MISOPROSTOL 200 UG/1
800 TABLET ORAL
Status: DISCONTINUED | OUTPATIENT
Start: 2023-05-16 | End: 2023-05-18 | Stop reason: HOSPADM

## 2023-05-16 RX ORDER — BISACODYL 10 MG
10 SUPPOSITORY, RECTAL RECTAL DAILY PRN
Status: DISCONTINUED | OUTPATIENT
Start: 2023-05-16 | End: 2023-05-16 | Stop reason: HOSPADM

## 2023-05-16 RX ORDER — MISOPROSTOL 200 UG/1
400 TABLET ORAL
Status: DISCONTINUED | OUTPATIENT
Start: 2023-05-16 | End: 2023-05-16 | Stop reason: HOSPADM

## 2023-05-16 RX ORDER — ONDANSETRON 4 MG/1
4 TABLET, ORALLY DISINTEGRATING ORAL EVERY 6 HOURS PRN
Status: DISCONTINUED | OUTPATIENT
Start: 2023-05-16 | End: 2023-05-16 | Stop reason: HOSPADM

## 2023-05-16 RX ORDER — KETOROLAC TROMETHAMINE 30 MG/ML
30 INJECTION, SOLUTION INTRAMUSCULAR; INTRAVENOUS
Status: DISCONTINUED | OUTPATIENT
Start: 2023-05-16 | End: 2023-05-16 | Stop reason: HOSPADM

## 2023-05-16 RX ORDER — METOCLOPRAMIDE 10 MG/1
10 TABLET ORAL EVERY 6 HOURS PRN
Status: DISCONTINUED | OUTPATIENT
Start: 2023-05-16 | End: 2023-05-16 | Stop reason: HOSPADM

## 2023-05-16 RX ORDER — METHYLERGONOVINE MALEATE 0.2 MG/ML
200 INJECTION INTRAVENOUS
Status: DISCONTINUED | OUTPATIENT
Start: 2023-05-16 | End: 2023-05-16 | Stop reason: HOSPADM

## 2023-05-16 RX ORDER — BUPIVACAINE HYDROCHLORIDE 2.5 MG/ML
INJECTION, SOLUTION EPIDURAL; INFILTRATION; INTRACAUDAL
Status: COMPLETED | OUTPATIENT
Start: 2023-05-16 | End: 2023-05-16

## 2023-05-16 RX ORDER — OXYTOCIN 10 [USP'U]/ML
10 INJECTION, SOLUTION INTRAMUSCULAR; INTRAVENOUS
Status: DISCONTINUED | OUTPATIENT
Start: 2023-05-16 | End: 2023-05-16 | Stop reason: HOSPADM

## 2023-05-16 RX ORDER — TRANEXAMIC ACID 10 MG/ML
1 INJECTION, SOLUTION INTRAVENOUS EVERY 30 MIN PRN
Status: DISCONTINUED | OUTPATIENT
Start: 2023-05-16 | End: 2023-05-18 | Stop reason: HOSPADM

## 2023-05-16 RX ORDER — IBUPROFEN 800 MG/1
800 TABLET, FILM COATED ORAL EVERY 6 HOURS PRN
Status: DISCONTINUED | OUTPATIENT
Start: 2023-05-16 | End: 2023-05-16 | Stop reason: HOSPADM

## 2023-05-16 RX ORDER — MISOPROSTOL 200 UG/1
800 TABLET ORAL
Status: DISCONTINUED | OUTPATIENT
Start: 2023-05-16 | End: 2023-05-16 | Stop reason: HOSPADM

## 2023-05-16 RX ORDER — FENTANYL CITRATE-0.9 % NACL/PF 10 MCG/ML
100 PLASTIC BAG, INJECTION (ML) INTRAVENOUS EVERY 5 MIN PRN
Status: DISCONTINUED | OUTPATIENT
Start: 2023-05-16 | End: 2023-05-16 | Stop reason: HOSPADM

## 2023-05-16 RX ORDER — NALOXONE HYDROCHLORIDE 0.4 MG/ML
0.2 INJECTION, SOLUTION INTRAMUSCULAR; INTRAVENOUS; SUBCUTANEOUS
Status: DISCONTINUED | OUTPATIENT
Start: 2023-05-16 | End: 2023-05-16 | Stop reason: HOSPADM

## 2023-05-16 RX ORDER — CARBOPROST TROMETHAMINE 250 UG/ML
250 INJECTION, SOLUTION INTRAMUSCULAR
Status: DISCONTINUED | OUTPATIENT
Start: 2023-05-16 | End: 2023-05-16 | Stop reason: HOSPADM

## 2023-05-16 RX ORDER — NALOXONE HYDROCHLORIDE 0.4 MG/ML
0.4 INJECTION, SOLUTION INTRAMUSCULAR; INTRAVENOUS; SUBCUTANEOUS
Status: DISCONTINUED | OUTPATIENT
Start: 2023-05-16 | End: 2023-05-16 | Stop reason: HOSPADM

## 2023-05-16 RX ORDER — HYDROCORTISONE 25 MG/G
CREAM TOPICAL 3 TIMES DAILY PRN
Status: DISCONTINUED | OUTPATIENT
Start: 2023-05-16 | End: 2023-05-16 | Stop reason: HOSPADM

## 2023-05-16 RX ORDER — SODIUM CHLORIDE, SODIUM LACTATE, POTASSIUM CHLORIDE, CALCIUM CHLORIDE 600; 310; 30; 20 MG/100ML; MG/100ML; MG/100ML; MG/100ML
INJECTION, SOLUTION INTRAVENOUS CONTINUOUS
Status: DISCONTINUED | OUTPATIENT
Start: 2023-05-16 | End: 2023-05-16 | Stop reason: HOSPADM

## 2023-05-16 RX ORDER — ACETAMINOPHEN 325 MG/1
650 TABLET ORAL EVERY 4 HOURS PRN
Status: DISCONTINUED | OUTPATIENT
Start: 2023-05-16 | End: 2023-05-16 | Stop reason: HOSPADM

## 2023-05-16 RX ORDER — IBUPROFEN 800 MG/1
800 TABLET, FILM COATED ORAL EVERY 6 HOURS PRN
Status: DISCONTINUED | OUTPATIENT
Start: 2023-05-16 | End: 2023-05-18 | Stop reason: HOSPADM

## 2023-05-16 RX ORDER — OXYCODONE HYDROCHLORIDE 5 MG/1
5 TABLET ORAL EVERY 4 HOURS PRN
Status: DISCONTINUED | OUTPATIENT
Start: 2023-05-16 | End: 2023-05-16 | Stop reason: HOSPADM

## 2023-05-16 RX ORDER — METHYLERGONOVINE MALEATE 0.2 MG/ML
200 INJECTION INTRAVENOUS
Status: DISCONTINUED | OUTPATIENT
Start: 2023-05-16 | End: 2023-05-18 | Stop reason: HOSPADM

## 2023-05-16 RX ORDER — ESCITALOPRAM OXALATE 20 MG/1
20 TABLET ORAL EVERY EVENING
Status: DISCONTINUED | OUTPATIENT
Start: 2023-05-16 | End: 2023-05-18 | Stop reason: HOSPADM

## 2023-05-16 RX ORDER — ONDANSETRON 2 MG/ML
4 INJECTION INTRAMUSCULAR; INTRAVENOUS EVERY 6 HOURS PRN
Status: DISCONTINUED | OUTPATIENT
Start: 2023-05-16 | End: 2023-05-16 | Stop reason: HOSPADM

## 2023-05-16 RX ORDER — ESCITALOPRAM OXALATE 20 MG/1
20 TABLET ORAL EVERY EVENING
Status: DISCONTINUED | OUTPATIENT
Start: 2023-05-16 | End: 2023-05-16 | Stop reason: HOSPADM

## 2023-05-16 RX ORDER — FENTANYL CITRATE 50 UG/ML
INJECTION, SOLUTION INTRAMUSCULAR; INTRAVENOUS
Status: COMPLETED
Start: 2023-05-16 | End: 2023-05-16

## 2023-05-16 RX ORDER — NALBUPHINE HYDROCHLORIDE 10 MG/ML
2.5-5 INJECTION, SOLUTION INTRAMUSCULAR; INTRAVENOUS; SUBCUTANEOUS EVERY 6 HOURS PRN
Status: DISCONTINUED | OUTPATIENT
Start: 2023-05-16 | End: 2023-05-16 | Stop reason: HOSPADM

## 2023-05-16 RX ORDER — FENTANYL CITRATE 50 UG/ML
INJECTION, SOLUTION INTRAMUSCULAR; INTRAVENOUS
Status: COMPLETED | OUTPATIENT
Start: 2023-05-16 | End: 2023-05-16

## 2023-05-16 RX ORDER — MODIFIED LANOLIN
OINTMENT (GRAM) TOPICAL
Status: DISCONTINUED | OUTPATIENT
Start: 2023-05-16 | End: 2023-05-18 | Stop reason: HOSPADM

## 2023-05-16 RX ORDER — PROCHLORPERAZINE 25 MG
25 SUPPOSITORY, RECTAL RECTAL EVERY 12 HOURS PRN
Status: DISCONTINUED | OUTPATIENT
Start: 2023-05-16 | End: 2023-05-16 | Stop reason: HOSPADM

## 2023-05-16 RX ORDER — IBUPROFEN 800 MG/1
800 TABLET, FILM COATED ORAL
Status: DISCONTINUED | OUTPATIENT
Start: 2023-05-16 | End: 2023-05-16 | Stop reason: HOSPADM

## 2023-05-16 RX ORDER — CITRIC ACID/SODIUM CITRATE 334-500MG
30 SOLUTION, ORAL ORAL
Status: DISCONTINUED | OUTPATIENT
Start: 2023-05-16 | End: 2023-05-16 | Stop reason: HOSPADM

## 2023-05-16 RX ORDER — LIDOCAINE 40 MG/G
CREAM TOPICAL
Status: DISCONTINUED | OUTPATIENT
Start: 2023-05-16 | End: 2023-05-16 | Stop reason: HOSPADM

## 2023-05-16 RX ORDER — BISACODYL 10 MG
10 SUPPOSITORY, RECTAL RECTAL DAILY PRN
Status: DISCONTINUED | OUTPATIENT
Start: 2023-05-16 | End: 2023-05-18 | Stop reason: HOSPADM

## 2023-05-16 RX ORDER — ACETAMINOPHEN 325 MG/1
650 TABLET ORAL EVERY 4 HOURS PRN
Status: DISCONTINUED | OUTPATIENT
Start: 2023-05-16 | End: 2023-05-18 | Stop reason: HOSPADM

## 2023-05-16 RX ORDER — PROCHLORPERAZINE MALEATE 10 MG
10 TABLET ORAL EVERY 6 HOURS PRN
Status: DISCONTINUED | OUTPATIENT
Start: 2023-05-16 | End: 2023-05-16 | Stop reason: HOSPADM

## 2023-05-16 RX ORDER — FENTANYL/ROPIVACAINE/NS/PF 2MCG/ML-.1
PLASTIC BAG, INJECTION (ML) EPIDURAL
Status: DISCONTINUED | OUTPATIENT
Start: 2023-05-16 | End: 2023-05-16 | Stop reason: HOSPADM

## 2023-05-16 RX ORDER — MISOPROSTOL 200 UG/1
400 TABLET ORAL
Status: DISCONTINUED | OUTPATIENT
Start: 2023-05-16 | End: 2023-05-18 | Stop reason: HOSPADM

## 2023-05-16 RX ORDER — OXYTOCIN/0.9 % SODIUM CHLORIDE 30/500 ML
340 PLASTIC BAG, INJECTION (ML) INTRAVENOUS CONTINUOUS PRN
Status: DISCONTINUED | OUTPATIENT
Start: 2023-05-16 | End: 2023-05-16 | Stop reason: HOSPADM

## 2023-05-16 RX ORDER — OXYTOCIN/0.9 % SODIUM CHLORIDE 30/500 ML
340 PLASTIC BAG, INJECTION (ML) INTRAVENOUS CONTINUOUS PRN
Status: DISCONTINUED | OUTPATIENT
Start: 2023-05-16 | End: 2023-05-18 | Stop reason: HOSPADM

## 2023-05-16 RX ORDER — CLINDAMYCIN PHOSPHATE 900 MG/50ML
900 INJECTION, SOLUTION INTRAVENOUS EVERY 8 HOURS
Status: DISCONTINUED | OUTPATIENT
Start: 2023-05-16 | End: 2023-05-16 | Stop reason: HOSPADM

## 2023-05-16 RX ORDER — MODIFIED LANOLIN
OINTMENT (GRAM) TOPICAL
Status: DISCONTINUED | OUTPATIENT
Start: 2023-05-16 | End: 2023-05-16 | Stop reason: HOSPADM

## 2023-05-16 RX ORDER — METOCLOPRAMIDE HYDROCHLORIDE 5 MG/ML
10 INJECTION INTRAMUSCULAR; INTRAVENOUS EVERY 6 HOURS PRN
Status: DISCONTINUED | OUTPATIENT
Start: 2023-05-16 | End: 2023-05-16 | Stop reason: HOSPADM

## 2023-05-16 RX ORDER — FENTANYL CITRATE-0.9 % NACL/PF 10 MCG/ML
PLASTIC BAG, INJECTION (ML) INTRAVENOUS
Status: COMPLETED
Start: 2023-05-16 | End: 2023-05-16

## 2023-05-16 RX ORDER — CARBOPROST TROMETHAMINE 250 UG/ML
250 INJECTION, SOLUTION INTRAMUSCULAR
Status: DISCONTINUED | OUTPATIENT
Start: 2023-05-16 | End: 2023-05-18 | Stop reason: HOSPADM

## 2023-05-16 RX ORDER — HYDROCORTISONE 25 MG/G
CREAM TOPICAL 3 TIMES DAILY PRN
Status: DISCONTINUED | OUTPATIENT
Start: 2023-05-16 | End: 2023-05-18 | Stop reason: HOSPADM

## 2023-05-16 RX ORDER — OXYTOCIN/0.9 % SODIUM CHLORIDE 30/500 ML
100-340 PLASTIC BAG, INJECTION (ML) INTRAVENOUS CONTINUOUS PRN
Status: DISCONTINUED | OUTPATIENT
Start: 2023-05-16 | End: 2023-05-16 | Stop reason: HOSPADM

## 2023-05-16 RX ORDER — DOCUSATE SODIUM 100 MG/1
100 CAPSULE, LIQUID FILLED ORAL DAILY
Status: DISCONTINUED | OUTPATIENT
Start: 2023-05-16 | End: 2023-05-16 | Stop reason: HOSPADM

## 2023-05-16 RX ORDER — DOCUSATE SODIUM 100 MG/1
100 CAPSULE, LIQUID FILLED ORAL DAILY
Status: DISCONTINUED | OUTPATIENT
Start: 2023-05-16 | End: 2023-05-18 | Stop reason: HOSPADM

## 2023-05-16 RX ORDER — OXYTOCIN 10 [USP'U]/ML
10 INJECTION, SOLUTION INTRAMUSCULAR; INTRAVENOUS
Status: DISCONTINUED | OUTPATIENT
Start: 2023-05-16 | End: 2023-05-18 | Stop reason: HOSPADM

## 2023-05-16 RX ADMIN — CLINDAMYCIN PHOSPHATE 900 MG: 900 INJECTION, SOLUTION INTRAVENOUS at 09:04

## 2023-05-16 RX ADMIN — KETOROLAC TROMETHAMINE 30 MG: 30 INJECTION, SOLUTION INTRAMUSCULAR; INTRAVENOUS at 10:53

## 2023-05-16 RX ADMIN — BUPIVACAINE HYDROCHLORIDE 1 ML: 2.5 INJECTION, SOLUTION EPIDURAL; INFILTRATION; INTRACAUDAL at 09:19

## 2023-05-16 RX ADMIN — ESCITALOPRAM OXALATE 20 MG: 20 TABLET, FILM COATED ORAL at 20:42

## 2023-05-16 RX ADMIN — Medication 340 ML/HR: at 10:26

## 2023-05-16 RX ADMIN — DOCUSATE SODIUM 100 MG: 100 CAPSULE, LIQUID FILLED ORAL at 10:54

## 2023-05-16 RX ADMIN — FENTANYL CITRATE 12.5 MCG: 50 INJECTION, SOLUTION INTRAMUSCULAR; INTRAVENOUS at 09:19

## 2023-05-16 RX ADMIN — SODIUM CHLORIDE, POTASSIUM CHLORIDE, SODIUM LACTATE AND CALCIUM CHLORIDE 1000 ML: 600; 310; 30; 20 INJECTION, SOLUTION INTRAVENOUS at 09:03

## 2023-05-16 RX ADMIN — IBUPROFEN 800 MG: 800 TABLET, FILM COATED ORAL at 18:01

## 2023-05-16 ASSESSMENT — ACTIVITIES OF DAILY LIVING (ADL)
ADLS_ACUITY_SCORE: 18
ADLS_ACUITY_SCORE: 35
ADLS_ACUITY_SCORE: 18
WALKING_OR_CLIMBING_STAIRS_DIFFICULTY: NO
ADLS_ACUITY_SCORE: 35
DRESSING/BATHING_DIFFICULTY: NO
ADLS_ACUITY_SCORE: 18
ADLS_ACUITY_SCORE: 35
DOING_ERRANDS_INDEPENDENTLY_DIFFICULTY: NO
WEAR_GLASSES_OR_BLIND: NO
TOILETING_ISSUES: NO
ADLS_ACUITY_SCORE: 18
NUMBER_OF_TIMES_PATIENT_HAS_FALLEN_WITHIN_LAST_SIX_MONTHS: 0
FALL_HISTORY_WITHIN_LAST_SIX_MONTHS: NO
CONCENTRATING,_REMEMBERING_OR_MAKING_DECISIONS_DIFFICULTY: NO
DIFFICULTY_EATING/SWALLOWING: NO

## 2023-05-16 NOTE — H&P
HISTORY AND PHYSICAL UPDATE ADMISSION EXAM    Name: Clementine Noel  YOB: 1985  Medical Record Number: [id number]    History of Present Illness: Clementine Noel is a 37 year old female who is 36w0d pregnant and being admitted for active labor management.She reports contractions started this am around 0600 and progressively got more painful and regular. Denies bleeding, LOF or changes in fetal movements.   Hx of 20sec shoulder dystocia with first delivery, relieved with Yi.  Weight 7#3oz.Current pregnancy EFW 2117gm @32 . Was scheduled for growth ultrasound and GBS tomorrow in clinic    Estimated Date of Delivery: 2023    EGA 36w0d    OB History    Para Term  AB Living   2 1 0 0 0 1   SAB IAB Ectopic Multiple Live Births   0 0 0 0 1      # Outcome Date GA Lbr Colin/2nd Weight Sex Delivery Anes PTL Lv   2 Current            1 Para                 Lab Results   Component Value Date    HGB 13.7 10/27/2021       Prenatal Complications:  Anxiety with depression stable on lexapro  Marginal cord insertion  AMA; 37  Hx of 20 shoulder dysticia  Unknown GBS    Exam:      There were no vitals taken for this visit.    Fetal heart Rate Category Cat 1-2. Occ variables with contractions.  Contractions 3/strong    HEENT grossly normal  ABD gravid, non-tender  EXT:  trace edema, moves freely  Vaginal exam 6-7 bulging bow on admit per RN at 0830  0900- anterior rim/-2 Bulging BOW ruptured with exam    Assessment: Late  active labor management  Unknown GBS    Plan: Admit - see IP orders  Pain medication- currently using NO2- requesting epid  Clinda running for unknown GBS  MD consultant on call Dr Davis aware of admit and available prn  Anticipate     Prenatal record reviewed.    Rosa Stanley CNM      2023   9:09 AM

## 2023-05-16 NOTE — PLAN OF CARE
Problem: Labor  Goal: Acceptable Pain Control  Outcome: Progressing   Goal Outcome Evaluation:                      Pain is less than or equal to 3 with oral pain meds

## 2023-05-16 NOTE — ANESTHESIA PROCEDURE NOTES
"Intrathecal injection Procedure Note    Pre-Procedure   Staff -        Anesthesiologist:  Shantanu Chawla MD       Performed By: anesthesiologist       Location: OB       Procedure Start/Stop Times: 5/16/2023 9:19 AM and 5/16/2023 9:35 AM       Pre-Anesthestic Checklist: patient identified, IV checked, risks and benefits discussed, informed consent, monitors and equipment checked, pre-op evaluation, at physician/surgeon's request and post-op pain management  Timeout:       Correct Patient: Yes        Correct Procedure: Yes        Correct Site: Yes        Correct Position: Yes   Procedure Documentation  Procedure: intrathecal injection       Diagnosis: surgical anesthetic       Patient Position: sitting       Patient Prep/Sterile Barriers: sterile gloves, mask, patient draped       Skin prep: Chloraprep       Insertion Site: L3-4. (midline approach).       Needle Gauge: 25.        Needle Length (Inches): 3.5        Spinal Needle Type: Pencan       Introducer used       Introducer: 20 G       # of attempts: 1 and  # of redirects:  0    Assessment/Narrative         Paresthesias: Yes.       CSF fluid: clear.       Opening pressure was cmH2O while  Supine.      Medication(s) Administered   0.25% PF Bupivacaine (Intrathecal) - Intrathecal   1 mL - 5/16/2023 9:19:00 AM  Fentanyl PF (Intrathecal) - Intrathecal   12.5 mcg - 5/16/2023 9:19:00 AM  Medication Administration Time: 5/16/2023 9:19 AM     Comments:  Smooth placement of intrathecal on 1st pass. Easy aspiration of CSF before/after injection of local.       FOR [facility] ([facility]) ONLY:   Pain Team Contact information: please page the Pain Team Via [employer]. Search \"Pain\". During daytime hours, please page the attending first. At night please page the resident first.      "

## 2023-05-16 NOTE — PROGRESS NOTES
Pt arrived to [facility] c/o ctx's. . 36.0wk.s GBS unknown. Hx of shoulder dystocia with 1st delivery. Pt brought to room 2120, triage RN placed pt on EFM and performed SVE, 6-7cm, bulging BOW noted. Pt requested epidural. VSS. Provider notified. Order to admit pt to labor and delivery. Report received from triage RN. Pt moved to room 254. IV placed, LR bolus administered in preparation for epidural placement.  FHT's category II. Uterus soft, non tender with ctx's noted Q 1-3, strong to palpation. MDA and CNM at bedside, decided to do ITN instead of epidural, pt consented.  Per CNM, SROM with SVE noted shortly after ITN administration. Pt delivered viable male at 0954 over 1st degree laceration. VSS. FF.

## 2023-05-16 NOTE — PLAN OF CARE
Problem: Plan of Care - These are the overarching goals to be used throughout the patient stay.    Goal: Optimal Comfort and Wellbeing  Outcome: Progressing     Problem: Labor  Goal: Effective Progression to Delivery  Outcome: Progressing   Goal Outcome Evaluation:

## 2023-05-16 NOTE — PROGRESS NOTES
Notified that baby ended up needing to be intubated and in need of art lines so is going to be transferred to the [facility]. Pt desires to go with her baby.  Report given to Dr Cordon at [facility] as the accepting physician ([phone]).  Paperwork completed.

## 2023-05-16 NOTE — PLAN OF CARE
Problem: Postpartum (Vaginal Delivery)  Goal: Hemostasis  Outcome: Progressing     Problem: Postpartum (Vaginal Delivery)  Goal: Optimal Pain Control and Function  Outcome: Progressing   Goal Outcome Evaluation:

## 2023-05-16 NOTE — L&D DELIVERY NOTE
Vaginal Delivery Note    Name: Clementine Noel  : 1985  MRN: [id number]    PRE DELIVERY DIAGNOSIS  1) 37 year old  3 Para 1011 at 36w0d      2) Anxiety with depression stable on lexapro  3) Marginal cord insertion  4) AMA; 37  5) Hx of 20 second shoulder dysticia  6) Unknown GBS       POST DELIVERY DIAGNOSIS  1) 37 year old  @ 36w0d  2) Delivery of a viable infant weighing   via     YOB: 2021     Birth Time: 9:54 AM       Augmentation No              Indication:   Induction No                      Indication:     Monitors: External     GBS: Unknown (1 dose clindamycin infused)    ROM: SROM during exam at 9.5cm  Fluid Type: Clear    Labor Analgesia/Anesthesia:Non-pharmacologic, Nitrous oxide and ITN    Cord pH obtained: No  Placenta Date/Time:    Placenta submitted to Pathology: Yes    Description of procedure:   37 year old  with PNC w/ MWC and pregnancy complicated by See HPI presented to L&D with labor contractions.  Her hospital course was uncomplicated.  Patient progressed to complete with spontaneous rupture of membranes   Shoulder Dystocia No  Operative Vaginal Delivery No  Infant spontaneously delivered over an small 2nd degree laceration and periurethral.   It was repaired in the normal fashion using ITN using 3-0 vicryl.  Infant delivered in SAIDA position.  Nuchal cord Yes    Delivered through    Placenta spontaneously delivered:   grossly intact with 3 vessel cord.  Infant:  Live, vigorous infant  was handed to mom then was brought over to warmer with SCN team at 1min for further stimulation.  Delivery was complicated by nothing Interventions included fundal massage and pitocin.    Delivery QBL (mL): 250    .Dr. Davis aware of patient status and remains available for consultation and collaboration as needed.    Mother and Infant stable.    Rosa Scott CNM    2023 10:16 AM

## 2023-05-16 NOTE — ANESTHESIA POSTPROCEDURE EVALUATION
"Patient: Clementine Noel    Procedure: * No procedures listed *       Anesthesia Type:  Spinal    Note:  Disposition: Inpatient   Postop Pain Control: Uneventful            Sign Out: Well controlled pain   PONV: No   Neuro/Psych: Uneventful            Sign Out: Acceptable/Baseline neuro status   Airway/Respiratory: Uneventful            Sign Out: Acceptable/Baseline resp. status   CV/Hemodynamics: Uneventful            Sign Out: Acceptable CV status; No obvious hypovolemia; No obvious fluid overload   Other NRE: NONE   DID A NON-ROUTINE EVENT OCCUR? No    Event details/Postop Comments:    Clementine Noel status post intrathecal for delivery. Satisfied with anesthetic care.    /58   Pulse 88   Temp 36.6  C (97.8  F) (Oral)   Resp 16   Ht 1.626 m (5' 4\")   Wt 98 kg (216 lb)   SpO2 99%   BMI 37.08 kg/m        Denies headache. Neurologically intact, anesthetic resolving. No apparent anesthetic complications. No follow up indicated.                Last vitals:  Vitals:    05/16/23 1009 05/16/23 1010 05/16/23 1014   BP:  110/58    Pulse:      Resp:      Temp:      SpO2: 99%  99%       Electronically Signed By: Shantanu Chawla MD  May 16, 2023  10:39 AM  "

## 2023-05-16 NOTE — ANESTHESIA PREPROCEDURE EVALUATION
Anesthesia Pre-Procedure Evaluation    Patient: Clementine Noel   MRN: [id number] : 1985        Procedure : * No procedures listed *          No past medical history on file.   No past surgical history on file.   Allergies   Allergen Reactions     Penicillins Rash     Rash was as a child. States has taken augmentin without problem.      Social History     Tobacco Use     Smoking status: Not on file     Smokeless tobacco: Not on file   Vaping Use     Vaping status: Not on file   Substance Use Topics     Alcohol use: Not on file      Wt Readings from Last 1 Encounters:   23 98 kg (216 lb)        Anesthesia Evaluation   Pt has had prior anesthetic. Type: OB Labor Epidural.    No history of anesthetic complications       ROS/MED HX  ENT/Pulmonary:  - neg pulmonary ROS     Neurologic:  - neg neurologic ROS     Cardiovascular:  - neg cardiovascular ROS     METS/Exercise Tolerance:     Hematologic:  - neg hematologic  ROS     Musculoskeletal:       GI/Hepatic:  - neg GI/hepatic ROS     Renal/Genitourinary:       Endo:  - neg endo ROS     Psychiatric/Substance Use:  - neg psychiatric ROS     Infectious Disease:       Malignancy:       Other:            Physical Exam    Airway             Respiratory Devices and Support         Dental  no notable dental history         Cardiovascular   cardiovascular exam normal          Pulmonary   pulmonary exam normal            Other findings: Physiologic changes of pregnancy    OUTSIDE LABS:  CBC:   Lab Results   Component Value Date    WBC 17.3 (H) 10/27/2021    HGB 13.7 10/27/2021    HCT 41.4 10/27/2021     10/27/2021     BMP:   Lab Results   Component Value Date     10/27/2021    POTASSIUM 4.1 10/27/2021    CHLORIDE 105 10/27/2021    CO2 19 (L) 10/27/2021    BUN 19 10/27/2021    CR 0.87 10/27/2021     10/27/2021     COAGS: No results found for: PTT, INR, FIBR  POC:   Lab Results   Component Value Date    HCG Negative 10/27/2021     HEPATIC: No  results found for: ALBUMIN, PROTTOTAL, ALT, AST, GGT, ALKPHOS, BILITOTAL, BILIDIRECT, CANDIDO  OTHER:   Lab Results   Component Value Date    YULIA 9.5 10/27/2021    CRP 0.4 10/27/2021       Anesthesia Plan    ASA Status:  2      Anesthesia Type: Spinal.              Consents    Anesthesia Plan(s) and associated risks, benefits, and realistic alternatives discussed. Questions answered and patient/representative(s) expressed understanding.    - Discussed:     - Discussed with:  Patient, Spouse         Postoperative Care            Comments:    Other Comments: Patient requests labor epidural. Chart reviewed, including labs. Reviewed options and risks with the patient, including but not limited to: bleeding, infection, damage to tissues under the skin (nerves, muscles, blood vessels), hypotension, headache, and epidural failure. Questions answered, consent signed. Patient agrees to elective intrathecal. Discussed ITN vs. Epidural with patient, RN, and Midwife. Will proceed with intrathecal.        neg OB ROS.       Shantanu Chawla MD

## 2023-05-16 NOTE — H&P
History and Physical     Clementine Noel MRN# [id number]   YOB: 1985 Age: 37 year old      Date of Admission: 2023    Primary care provider: Nidia Correa      Assessment and Plan:     Clementine Noel 37 year old  PPD#0 s/p  at [facility] here as a transfer of care for infant who requires NICU cares. Pregnancy and delivery uncomplicated.     #MITCHEL/MDD  - Continue PTA Lexapro   - Mood stable     #Routine Postpartum  Pain: Well-controlled with scheduled tylenol, ibuprofen,   Hgb: VSS as noted above, asymptomatic. Will discharge with PO Fe if hgb < 10. Hgb pending for the morning .  GI:  PRN Senna/Colace.  PRN Simethicone.   PPx:  Encouraged ambulation   Rh: Positive  Rub:  Immune  Baby: In NICU  Feed: Pumping  BC: Not discussed   Dispo: Plan for home likely PPD#2.    Appreciate Dr. Fitzgerald's note above, patient also seen and examined by me. I agree with the note above.   Meg Milan MD              HPI:     Clementine Noel is a 37 year old  now recently PPD#0 s/p  this morning - ERN[facility] from [facility] for  NICU care. Baby initially intubated, required arterial line shortly after delivery.     Patient feels well. Bleeding similar to period. No lightheadedness or dizziness. Eating and drinking without nausea or vomiting. Pain is minimal. Pumping for baby in the NICU.     Pregnancy notable for:   - MDD/MITCHEL     OB History:                  Past Medical History:   MDD/MITCHEL           Past Surgical History:   Non-contributory           Social History:     Social History     Tobacco Use    Smoking status: Not on file    Smokeless tobacco: Not on file   Vaping Use    Vaping status: Not on file   Substance Use Topics    Alcohol use: Not on file             Family History:   No family history on file.          Immunizations:     Immunization History   Administered Date(s) Administered    COVID-19 MONOVALENT 12+ (Pfizer) 04/15/2021, 2021             Allergies:     Allergies   Allergen Reactions    Penicillins Rash     Rash was as a child. States has taken augmentin without problem.             Medications:     Medications Prior to Admission   Medication Sig Dispense Refill Last Dose    cetirizine (ZYRTEC) 10 MG tablet Take 10 mg by mouth every evening       escitalopram (LEXAPRO) 20 MG tablet Take 20 mg by mouth every evening       Prenatal Vit-Fe Fumarate-FA (PRENATAL MULTIVITAMIN W/IRON) 27-0.8 MG tablet Take 1 tablet by mouth every evening                Review of Systems & Physical Exam:     The Review of Systems is negative other than noted in the HPI      BP (!) 129/91 (BP Location: Left arm, Patient Position: Semi-Sher's)   Pulse 95   Temp 98.6  F (37  C) (Oral)   Gen: Well appearing   CV: RRR  Lungs: CTAB, non-labored breathing  Abd: Appropriately tender. Uterus firm 1 below umbilicus   Ext: Trace peripheral extremity edema

## 2023-05-17 LAB
HGB BLD-MCNC: 11.8 G/DL (ref 11.7–15.7)
HGB BLD-MCNC: 12 G/DL (ref 11.7–15.7)
PATH REPORT.COMMENTS IMP SPEC: NORMAL
PATH REPORT.FINAL DX SPEC: NORMAL
PATH REPORT.GROSS SPEC: NORMAL
PATH REPORT.MICROSCOPIC SPEC OTHER STN: NORMAL
PATH REPORT.RELEVANT HX SPEC: NORMAL
PHOTO IMAGE: NORMAL

## 2023-05-17 PROCEDURE — 250N000013 HC RX MED GY IP 250 OP 250 PS 637: Performed by: STUDENT IN AN ORGANIZED HEALTH CARE EDUCATION/TRAINING PROGRAM

## 2023-05-17 PROCEDURE — 85018 HEMOGLOBIN: CPT

## 2023-05-17 PROCEDURE — 85018 HEMOGLOBIN: CPT | Performed by: STUDENT IN AN ORGANIZED HEALTH CARE EDUCATION/TRAINING PROGRAM

## 2023-05-17 PROCEDURE — 36415 COLL VENOUS BLD VENIPUNCTURE: CPT

## 2023-05-17 PROCEDURE — 120N000002 HC R&B MED SURG/OB UMMC

## 2023-05-17 PROCEDURE — 99231 SBSQ HOSP IP/OBS SF/LOW 25: CPT | Mod: GC | Performed by: OBSTETRICS & GYNECOLOGY

## 2023-05-17 PROCEDURE — 36415 COLL VENOUS BLD VENIPUNCTURE: CPT | Performed by: STUDENT IN AN ORGANIZED HEALTH CARE EDUCATION/TRAINING PROGRAM

## 2023-05-17 RX ORDER — ONDANSETRON 4 MG/1
4 TABLET, ORALLY DISINTEGRATING ORAL EVERY 6 HOURS PRN
Status: DISCONTINUED | OUTPATIENT
Start: 2023-05-17 | End: 2023-05-18 | Stop reason: HOSPADM

## 2023-05-17 RX ADMIN — IBUPROFEN 800 MG: 800 TABLET, FILM COATED ORAL at 07:22

## 2023-05-17 RX ADMIN — ACETAMINOPHEN 650 MG: 325 TABLET ORAL at 14:47

## 2023-05-17 RX ADMIN — DOCUSATE SODIUM 100 MG: 100 CAPSULE, LIQUID FILLED ORAL at 10:48

## 2023-05-17 RX ADMIN — ACETAMINOPHEN 650 MG: 325 TABLET ORAL at 01:29

## 2023-05-17 RX ADMIN — IBUPROFEN 800 MG: 800 TABLET, FILM COATED ORAL at 14:47

## 2023-05-17 RX ADMIN — ACETAMINOPHEN 650 MG: 325 TABLET ORAL at 10:48

## 2023-05-17 RX ADMIN — IBUPROFEN 800 MG: 800 TABLET, FILM COATED ORAL at 01:29

## 2023-05-17 RX ADMIN — ACETAMINOPHEN 650 MG: 325 TABLET ORAL at 19:56

## 2023-05-17 RX ADMIN — ESCITALOPRAM OXALATE 20 MG: 20 TABLET, FILM COATED ORAL at 19:51

## 2023-05-17 RX ADMIN — IBUPROFEN 800 MG: 800 TABLET, FILM COATED ORAL at 19:51

## 2023-05-17 RX ADMIN — ACETAMINOPHEN 650 MG: 325 TABLET ORAL at 06:31

## 2023-05-17 ASSESSMENT — ACTIVITIES OF DAILY LIVING (ADL)
CHANGE_IN_FUNCTIONAL_STATUS_SINCE_ONSET_OF_CURRENT_ILLNESS/INJURY: NO
ADLS_ACUITY_SCORE: 18
FALL_HISTORY_WITHIN_LAST_SIX_MONTHS: NO
ADLS_ACUITY_SCORE: 18

## 2023-05-17 NOTE — PROGRESS NOTES
Postpartum Progress Note  Clementine Noel  [id number]    Subjective: Doing well this AM, no complaints or concerns. Was able to get some rest. Ambulating without dizziness, eating and drinking without nausea. Lochia appropriate. Voiding spontaneously. Passing gas, has had a BM. Pain well controlled on oral medications. Pumping for baby in NICU, baby is reportedly doing well. No headache, vision changes, CP, SOB, RUQ pain.    Objective:  Vitals:    23 1700 23 2047 23 0100 23 0505   BP: (!) 129/91 121/83 118/78 120/76   BP Location: Left arm Left arm Left arm Left arm   Patient Position: Semi-Sher's Semi-Sher's Semi-Sher's Semi-Sher's   Cuff Size:  Adult Regular Adult Regular Adult Regular   Pulse: 95 91 90    Resp:  18 18 16   Temp: 98.6  F (37  C) 98.5  F (36.9  C) 98.3  F (36.8  C) 98.3  F (36.8  C)   TempSrc: Oral Oral Oral Oral   SpO2: 99%        General: NAD, comfortable  Heart: Regular rate, extremities warm and well-perfused  Lungs: Breathing comfortably, on room air  Abdomen: Soft, non-tender, non-distended; fundus firm and 1 cm below umbilicus  Extremities: Trace edema in BLE    Labs:  N/A    Assessment/Plan: 37 year old  who is PPD#1 s/p  at 36w0d at [facility], who was transferred to [facility] for infant NICU cares. Pregnancy was notable for MDD/MITCHEL. Currently stable and doing well.      # Postpartum  - Continue routine post-partum cares.     - Heme: Appropriate blood loss during delivery. AM Hgb pending. Continue to monitor for s/s of anemia. Repeat labs prn.  - Pain: Continue scheduled Tylenol and ibuprofen  - GI: PRN senna, miralax daily, simethicone, anti-emetics.  - : Voiding spontaneously   - Baby:  Stable in NICU, pumping  - Contraception: Likely POPs then vasectomy; reviewed pregnancy spacing recommendations  - Rh pos, Rubella immune  - PPx: Encourage ambulation    Dispo: Discharge to home when meeting postpartum goals, likely tomorrow      dEna Alvarado,  MD  OB/GYN Resident PGY-3  8:13 AM May 17, 2023       Staff MD Note    I appreciate the note by Dr. Alvarado.  Any necessary changes have been made by me.  I saw and evaluated the patient and agree with the findings and plan of care as documented in the note.    Emerita Cesar MD

## 2023-05-17 NOTE — PROVIDER NOTIFICATION
05/17/23 1540   Provider Notification   Provider Name/Title Dr. Noel (G3)   Method of Notification Electronic Page   Request Evaluate-Remote   Notification Reason Status Update     Update: Patient reports she is feeling better after resting. Lab is drawing hemoglobin now.

## 2023-05-17 NOTE — DISCHARGE SUMMARY
[facility] Discharge Summary    Clementine Noel MRN# [id number]   Age: 37 year old YOB: 1985     Date of Admission:  2023  Date of Discharge::  2023  Admitting Physician:  Lisa Cordon MD  Discharge Physician:  Paula Carter MD          Admission Diagnoses:   - S/p   - MITCHEL/MDD          Discharge Diagnosis:     - Same           Procedures:     Procedure(s): - None          Medications Prior to Admission:     Medications Prior to Admission   Medication Sig Dispense Refill Last Dose     cetirizine (ZYRTEC) 10 MG tablet Take 10 mg by mouth every evening        escitalopram (LEXAPRO) 20 MG tablet Take 20 mg by mouth every evening        Prenatal Vit-Fe Fumarate-FA (PRENATAL MULTIVITAMIN W/IRON) 27-0.8 MG tablet Take 1 tablet by mouth every evening                Discharge Medications:        Review of your medicines      START taking      Dose / Directions   acetaminophen 325 MG tablet  Commonly known as: TYLENOL      Dose: 650 mg  Take 2 tablets (650 mg) by mouth every 6 hours as needed for mild pain Start after Delivery.  Quantity: 100 tablet  Refills: 0     ibuprofen 600 MG tablet  Commonly known as: ADVIL/MOTRIN      Dose: 600 mg  Take 1 tablet (600 mg) by mouth every 6 hours as needed for moderate pain Start after delivery  Quantity: 60 tablet  Refills: 0     senna-docusate 8.6-50 MG tablet  Commonly known as: SENOKOT-S/PERICOLACE      Dose: 1 tablet  Take 1 tablet by mouth daily Start after delivery.  Quantity: 100 tablet  Refills: 0        CONTINUE these medicines which have NOT CHANGED      Dose / Directions   cetirizine 10 MG tablet  Commonly known as: zyrTEC      Dose: 10 mg  Take 10 mg by mouth every evening  Refills: 0     escitalopram 20 MG tablet  Commonly known as: LEXAPRO      Dose: 20 mg  Take 20 mg by mouth every evening  Refills: 0     prenatal multivitamin w/iron 27-0.8 MG tablet      Dose: 1 tablet  Take 1 tablet by mouth every evening  Refills:  0           Where to get your medicines      These medications were sent to [facility] [place], [place] - [address]  [address] [facility] [address], [facility] [address]    Phone: [phone]     acetaminophen 325 MG tablet    ibuprofen 600 MG tablet    senna-docusate 8.6-50 MG tablet             Consultations:   - NICU          Brief Admission History   Clementine Noel 37 year old  PPD#0 s/p  at [facility] here as a transfer of care for infant who requires NICU cares. Pregnancy and delivery uncomplicated.            Hospital Course:   The patient's hospital course was unremarkable. On discharge, her pain was well controlled. Vaginal bleeding is similar to peak menstrual flow. Voiding without difficulty. Ambulating well and tolerating a normal diet.  No fever. Breastfeeding well. Infant is stable. Passing flatus and had a BM yesterday. She was discharged on post-partum day #2.    Post-partum hemoglobin: 12.0          Discharge Instructions and Follow-Up:     Discharge diet: Regular   Discharge activity: Pelvic rest for 6 weeks including no sexual intercourse, tampons, or douching.    Discharge follow-up: Follow up with your primary OB for a routine postpartum visit in 6 weeks           Discharge Disposition:     Discharged to home in stable condition        Edna Alvarado MD  OB/GYN Resident PGY-3  7:48 AM May 18, 2023     Appreciate note by Dr. Alvarado. Patient has been seen and examined by me separate from the resident, agree with above note.     Paula Carter MD  9:08 AM

## 2023-05-17 NOTE — PROVIDER NOTIFICATION
"   05/17/23 1218   Provider Notification   Provider Name/Title Dr. Noel (G3)   Method of Notification Electronic Page   Request Evaluate-Remote   Notification Reason Status Update;Vital Signs Change     Pt reports feeling suddenly \"icky\" and dizzy even in bed. VSS except for temp that has increased from 99.2 to 99.4. Pt also reporting upper abdominal cramping and slight nausea. Has had all pain medications available. Further orders?  "

## 2023-05-17 NOTE — CONSULTS
This psychosocial note was copied from baby's chart.    Social Work Initial Consult     DATA/ASSESSMENT     General Information  Assessment completed with: Parents, Clementine Noel  Type of visit: Psychosocial Assessment      Reason for Consult:  (NICU Admission)     Living Environment:   Primary caregiver: mother, father  Lives with: mother, father, sister         Current living arrangements: house          Able to return to prior arrangements: yes     Family Factors  Family Risk Factors:    Family Strength Factors: demonstrated commitment to being present and engaged in cares, experienced parents, parental employment, reliable transportation, stable housing     Assessment of Support  Parental Marital Status:     Description of Support System: Supportive     Employment/Financial  Patient's caregiver works full/part time: Yes     Patient works full/part time: Yes        Coping/Stress  Major Change/Loss/Stressor: pregnancy   Patient Personal Strengths: able to adapt, positive attitude, strong support system Sources of Support: community support, friend(s), parent(s)     Reaction to Health Status: adjusting Understanding of Condition and Treatment: adequate understanding of medical condition, adequate understanding of treatment      Additional Information:  This writer met with Clementine at bedside for a brief psychosocial assessment.  Lunch had just arrived and Clementine needed to eat.  Clementine reports [facility] is doing well [facility]  Clementine report both she and her  work for [employer] and have paid leaves.  They have a two year old daughter at home.  Clementine reports a strong support network of family, friends and colleagues. Clementine denies any history of mental or chemical health concerns.  Reviewed symptoms of PMAD and higher incidence for NICU families.  Parents were given a [employer] parking pass at their request.  No other needs at this  time.     INTERVENTION     Conducted chart review and consulted with medical team regarding plan of care. Introduced SW role and scope of practice. Provided SW contact info     PLAN     SW will continue to follow for supportive intervention.      Genesis STINSON, MSW, [facility]  Maternal Child Health   [phone]--office  [phone]--pager

## 2023-05-17 NOTE — LACTATION NOTE
This note was copied from a baby's chart.  D: Clementine states she is normally in good health, takes lexapro and zyrtec regularly, and has no history of breast/chest surgery or trauma.  Her medical record indicates a history of anxiety. Baby Russ is her second child. She has already started to pump. She pumped exclusively for her first child and continued pumping for over a year.      I: We reviewed lactation information in the NICU guidebook including benefits of kangaroo care, the benefits of breast milk, the physiology of colostrum and milk production, and pumping guidelines/ CDC cleaning recommendations. We discussed hand expression and hands-on pumping, information on collection and labeling milk, and logging. We discussed usefulness of a hands-free pumping bra.  We talked about medications during lactation.  She verbalized understanding. She has a pump at home and will contact her insurance company to check on rental coverage if needed.      A:  Mom has information she needs to initiate her supply.     P:  Will continue to provide lactation support.      Maryjane Arnold RN, IBCLC   Lactation Consultant  Ascom: *[id number]  Office: [phone]

## 2023-05-17 NOTE — PROVIDER NOTIFICATION
05/17/23 1325   Provider Notification   Provider Name/Title Dr. Noel (G3)   Method of Notification Electronic Page   Request Evaluate-Remote   Notification Reason Status Update;Vital Signs Change     Update: Patient reports that she still feels lightheaded in bed. Temp is down to 99. Otherwise, no new symptoms.

## 2023-05-18 ENCOUNTER — HOSPITAL ENCOUNTER (INPATIENT)
Facility: CLINIC | Age: 38
End: 2023-05-18
Admitting: OBSTETRICS & GYNECOLOGY
Payer: COMMERCIAL

## 2023-05-18 VITALS
SYSTOLIC BLOOD PRESSURE: 128 MMHG | TEMPERATURE: 98.5 F | DIASTOLIC BLOOD PRESSURE: 85 MMHG | BODY MASS INDEX: 35.76 KG/M2 | RESPIRATION RATE: 16 BRPM | HEART RATE: 87 BPM | WEIGHT: 208.31 LBS | OXYGEN SATURATION: 99 %

## 2023-05-18 PROCEDURE — 250N000013 HC RX MED GY IP 250 OP 250 PS 637: Performed by: STUDENT IN AN ORGANIZED HEALTH CARE EDUCATION/TRAINING PROGRAM

## 2023-05-18 RX ORDER — ACETAMINOPHEN 325 MG/1
650 TABLET ORAL EVERY 6 HOURS PRN
Qty: 100 TABLET | Refills: 0 | Status: SHIPPED | OUTPATIENT
Start: 2023-05-18

## 2023-05-18 RX ORDER — IBUPROFEN 600 MG/1
600 TABLET, FILM COATED ORAL EVERY 6 HOURS PRN
Qty: 60 TABLET | Refills: 0 | Status: SHIPPED | OUTPATIENT
Start: 2023-05-18

## 2023-05-18 RX ORDER — AMOXICILLIN 250 MG
1 CAPSULE ORAL DAILY
Qty: 100 TABLET | Refills: 0 | Status: SHIPPED | OUTPATIENT
Start: 2023-05-18

## 2023-05-18 RX ADMIN — ACETAMINOPHEN 650 MG: 325 TABLET ORAL at 00:23

## 2023-05-18 RX ADMIN — IBUPROFEN 800 MG: 800 TABLET, FILM COATED ORAL at 09:40

## 2023-05-18 RX ADMIN — ACETAMINOPHEN 650 MG: 325 TABLET ORAL at 09:40

## 2023-05-18 RX ADMIN — DOCUSATE SODIUM 100 MG: 100 CAPSULE, LIQUID FILLED ORAL at 09:40

## 2023-05-18 RX ADMIN — ACETAMINOPHEN 650 MG: 325 TABLET ORAL at 04:24

## 2023-05-18 RX ADMIN — BENZOCAINE AND LEVOMENTHOL: 200; 5 SPRAY TOPICAL at 10:07

## 2023-05-18 RX ADMIN — IBUPROFEN 800 MG: 800 TABLET, FILM COATED ORAL at 01:59

## 2023-05-18 ASSESSMENT — ACTIVITIES OF DAILY LIVING (ADL)
ADLS_ACUITY_SCORE: 18

## 2023-05-18 NOTE — PLAN OF CARE
Goal Outcome Evaluation:      Problem: Plan of Care - These are the overarching goals to be used throughout the patient stay.    Goal: Optimal Comfort and Wellbeing  Outcome: Progressing  Intervention: Provide Person-Centered Care  Recent Flowsheet Documentation  Taken 5/17/2023 0100 by Adriana Zabala RN  Trust Relationship/Rapport:    care explained    choices provided    emotional support provided       VSS and maternal assessments WNL. Pain adequately managed with tylenol and ibuprofen. Voiding without difficulty. Working on pumping for baby who is in the NICU. Mom visited baby in NICU one time during shift. Spouse present at the bedside and supportive. Continue with plan of care.            
"Data: Vital signs within normal limits. Postpartum checks within normal limits - see flow record. Patient eating and drinking normally. Patient able to empty bladder independently and is up ambulating. No apparent signs of infection.  Patient performing self cares and is breast pumping for infant in NICU. Patient had a period this afternoon where she suddenly felt very unwell (dizzy, nauseous, and overall \"icky\" feeling) after she came back from NICU, provider was notified. VS were all stable except for slight temp (99.2-99.4) as well as bleeding was stable, see flowsheet. Patient declined nausea medication and rested for a couple of hours after which time she said she felt better. Repeat STAT hemoglobin was WNL.  Action: Pain has been adequately managed with oral medications.   Response: Patient visited infant in NICU frequently throughout the shift. Support person, : Niles, present and attentive to patient.   Plan: Continue with plan of care.   "
Data: Vital signs within normal limits. Postpartum checks within normal limits - see flow record. Patient eating and drinking normally. Patient able to empty bladder independently and is up ambulating. No apparent signs of infection.  healing well. Patient performing self cares and is pumping for baby in NICU.  Action: Patient medicated during the shift for pain. See MAR. Patient reassessed within 1 hour after each medication and pain was improved - patient stated she was comfortable. Patient education done about plan of care. See flow record.  Response: Positive attachment behaviors observed with infant. Support person, , Sebastian, present.   Plan: Anticipate discharge today.  
Goal Outcome Evaluation:       Pt transferred from [facility] via ambulance. Pt arrived to the unit at 1655. G2 resident was paged upon admission and notified of pts arrival. Room orientation completed, admission questions started, new band applied. Blood pressure was 129/91 on admit. G2, Schmiesing aware of BP and said to recheck at the next 4 hour check. Orders were placed by resident and she said they will come see pt later this shift. Continue with postpartum cares.                  
Goal Outcome Evaluation:       VSS. Postpartum assessment WDL. Patient ambulating independently and able to perform self cares. Eating and drinking without nausea. Pain managed with tylenol and ibuprofen. Patient is pumping for baby down in NICU. Positive attachment behavior observed between mom and baby. Patient goes down to NICU frequently to visit baby. , Sebastian, is at the bedside and very supportive of mom and baby. Continue with POC.    11am discharge today                 
Goal Outcome Evaluation:      Plan of Care Reviewed With: patient    Overall Patient Progress: improvingOverall Patient Progress: improving     Vss. Pt is up ambulating independently within room. Voiding without difficulty. Taking Ibu for pain control. Started pt pumping for baby down in the NICU. Pt has been down to NICU to visit infant twice this shift. Medicated per orders, educated pt on pumping frequency. Continue with postpartum cares and assist as needed.       
Patient discharged. All education reviewed, questions addressed, medication reviewed and verified. EDS complete, BC completed, Videos reviewed. Has a pump at home.   
TOKEN:'[email or web address]'

## 2023-05-18 NOTE — PROGRESS NOTES
"Postpartum Progress Note  Clementine Noel  [id number]    Subjective: Doing well this AM, no complaints or concerns. Had an episode of feeling \"icky\" yesterday after ambulating to NICU, but improved after rest. Denies any lightheadedness or dizziness with ambulation. Eating and drinking without nausea. Lochia appropriate. Voiding spontaneously. Passing gas, has had a BM. Pain well controlled on oral medications. Pumping for baby in NICU, baby is reportedly doing well. No headache, vision changes, CP, SOB, RUQ pain.    Objective:  Vitals:    23 1319 23 1700 23 2308 23 0603   BP:  127/76 114/55 115/61   BP Location:  Right arm Right arm Right arm   Patient Position:  Semi-Sher's Semi-Sher's Semi-Sher's   Cuff Size:  Adult Regular Adult Regular Adult Regular   Pulse:  88 90 84   Resp:  16 16 16   Temp: 99  F (37.2  C) 97.9  F (36.6  C) 98.6  F (37  C) 98.4  F (36.9  C)   TempSrc: Oral Oral Oral Oral   SpO2:       Weight:    94.5 kg (208 lb 5 oz)     General: NAD, comfortable  Heart: Regular rate, extremities warm and well-perfused  Lungs: Breathing comfortably, on room air  Abdomen: Soft, non-tender, non-distended; fundus firm and 1 cm below umbilicus  Extremities: Trace edema in BLE    Labs:  N/A    Assessment/Plan: 37 year old  who is PPD#2 s/p  at 36w0d at [facility], who was transferred to [facility] for infant NICU cares. Pregnancy was notable for MDD/MITCHEL. Currently stable and doing well.      # Postpartum  - Continue routine post-partum cares.     - Heme: Appropriate blood loss during delivery. Post-delivery Hgb 12.0. Continue to monitor for s/s of anemia. Repeat labs prn.  - Pain: Continue scheduled Tylenol and ibuprofen  - GI: PRN senna, miralax daily, simethicone, anti-emetics.  - : Voiding spontaneously   - Baby:  Stable in NICU, pumping  - Contraception: Likely POPs then vasectomy; reviewed pregnancy spacing recommendations  - Rh pos, Rubella immune  - PPx: Encourage " ambulation    Dispo: Discharge to home today      Edna Alvarado MD  OB/GYN Resident PGY-3  7:46 AM May 18, 2023       Appreciate note by Dr. Alvarado. Patient has been seen and examined by me separate from the resident, agree with above note.     Paula Carter MD  9:08 AM

## 2023-05-18 NOTE — DISCHARGE INSTRUCTIONS
Postpartum Vaginal Delivery Instructions    Activity     Ask family and friends for help when you need it.  Do not place anything in your vagina for 6 weeks.  You are not restricted on other activities, but take it easy for a few weeks to allow your body to recover from delivery.  You are able to do any activities you feel up to that point.  No driving until you have stopped taking your pain medications (usually two weeks after delivery).     Call your health care provider if you have any of these symptoms:     Increased pain, swelling, redness, or fluid around your stiches from an episiotomy or perineal tear.  A fever above 100.4 F (38 C) with or without chills when placing a thermometer under your tongue.  You soak a sanitary pad with blood within 1 hour, or you see blood clots larger than a golf ball.  Bleeding that lasts more than 6 weeks.  Vaginal discharge that smells bad.  Severe pain, cramping or tenderness in your lower belly area.  A need to urinate more frequently (use the toilet more often), more urgently (use the toilet very quickly), or it burns when you urinate.  Nausea and vomiting.  Redness, swelling or pain around a vein in your leg.  Problems breastfeeding or a red or painful area on your breast.  Chest pain and cough or are gasping for air.  Problems coping with sadness, anxiety, or depression.  If you have any concerns about hurting yourself or the baby, call your provider immediately.   You have questions or concerns after you return home.     Keep your hands clean:  Always wash your hands before touching your perineal area and stitches.  This helps reduce your risk of infection.  If your hands aren't dirty, you may use an alcohol hand-rub to clean your hands. Keep your nails clean and short.      Preeclampsia   Call your doctor right away if you have any of the following:  - Edema (swelling) in your face or hands  - Rapid weight gain-about 1 pound or more in a day  - Headache  - Abdominal  pain on your right side  - Vision problems (flashes or spots)  - You have questions or concerns once you return home.

## 2023-05-19 ENCOUNTER — LACTATION ENCOUNTER (OUTPATIENT)
Age: 38
End: 2023-05-19

## 2023-05-19 NOTE — LACTATION NOTE
"This note was copied from a baby's chart.  LACTATION DISCHARGE INSTRUCTIONS for Cindy:       Congratulations on your approaching discharge day!  Our goal is to help you have all the information, skills and equipment you need to help you meet your lactation goals at home.  The following handouts will give you information on:      [facility] handout on recommendations for storing and preparing human milk at home    A feeding and diaper log, with how many times a day your baby should eat, as well as how many wet and soiled diapers per day    Medications, illnesses, procedures    Lactation support    Outpatient (in-person and virtual) lactation resources    Telephone and online support    Transitioning to more latching at home    How to wean off the nipple shield    How to wean from the breast pump    How to get a feeding test scale    Information on growth spurts          CDC HANDOUT ON STORING AND PREPARING HUMAN MILK AT HOME      Please see attached handout     https://www.cdc.gov/breastfeeding/recommendations/handling_breastmilk.htm          FEEDING LOG: BABY'S FIRST WEEK, SECOND WEEK AND BEYOND      Please see attached feeding logs    Goal is to eat at least 8 times in 24 hours    Goal is to have at least 6 wet diapers in 24 hours    Talk to your provider about goal for soiled diapers.  Each baby is different depending on age and what they are eating        OTHER DISCHARGE INFORMATION    Medications:     Some women may find certain types of hormonal birth control, decongestants or antihistamines may impact supply-- talk to your provider.    Always get a second opinion from a lactation consultant or a provider familiar with lactation if told to stop latching or \"pump and dump\" when starting a new medication, having a procedure or you are ill; most of the time things are compatible.        TRANSITIONING TO MORE FEEDINGS AT HOME    Often, babies go home from the NICU doing a combination of breastfeeding and " "bottle feeding.  With time and patience, most will go on to nurse most or all their feedings.  infants, in particular, may not be able to fully nurse until at or after their due date. To ensure your baby is taking adequate volumes, some babies may need supplemental bottle after breastfeeding. Keep these things in mind as you nurse your baby at home:      Good time management is key!  Make feedings efficient so you have time to eat, sleep, and pump.      It is important to latch your baby frequently, even if he or she is taking small amounts. Staying skin to skin will also help keep your baby \"breast oriented\".  Going days without latching will make it more difficult.  Babies can be re-taught how to latch, but this is very time consuming and not always successful.        Please see a lactation consultant ASAP if you are not meeting your latching goal.  It is easier to make changes now, versus weeks or months down the road.            HOW TO WEAN FROM THE NIPPLE SHIELD    Many NICU babies use a nipple shield for a period of time, especially premature babies and babies recovering from illness or surgery.  It helps them stay latched on and get milk more easily.    How do you know it's time to try off the nipple shield?      Your baby is waking on their own before feedings    Your baby is able to stay awake during the entire feeding, without a lot of encouragement to stay awake    Your baby's suck is significantly stronger     Your baby is taking full feedings at the breast    Typically, at or after their due date    How do I wean off the nipple shield?      Start the feeding with the nipple shield in place, then take the nipple shield off [facility] through the feeding and re-latch    Try at feedings where your baby is calm, not when they are frantically hungry    Middle of the night can be a good time to try, when everyone is " "relaxed    https://www.[facility].Greenling/blog/my-ten-step-process-for-weaning-from-the-nipple-shield/    How do I know my baby is eating well without the nipple shield?      They seem satisfied after feeding    Your breasts feels softer after the feeding    Your baby has enough wet and soiled diapers    If using a breastfeeding scale-- the numbers on the scale are similar to a feeding with a nipple shield    If you have problems getting off the nipple shield, please make an appointment with a lactation consultant.            HOW TO WEAN FROM THE PUMP (AFTER YOUR BABY TAKES A FULL BREASTFEEDING)    Your milk supply may be greater than what your baby needs after discharge. It is important that you gradually wean from pumping after your baby takes a full breastfeeding (without needing a top-off).  If you wean too quickly, you will be uncomfortable and you run the risk of causing your supply to drop.    If you have been pumping less than two weeks:      If you are uncomfortable after a full breastfeeding, pump only until you are comfortable (versus pumping until empty)      If you have been pumping two weeks or more:      Continue to pump after every breastfeeding, but gradually decrease the time or volume you pump.   o Example based on time: If you have been pumping 20 minutes after each full breastfeeding, decrease to 18 minutes for two days. If still comfortable, decrease to 16 minutes for another two days.   o Example based on volume: If you normally pump 2 oz after a feeding, pump 1.75 oz for a few days, 1.5 oz for a few days, etc    Continue this way until you no longer need to pump (after breastfeeding).      Remember that if you are bottle feeding some feedings, you need to pump at the time you would have latched your baby. If you do not, you might start decreasing your milk supply.          OTHER LATCHING INFORMATION    Growth Spurts: Common times for \"growth spurts\" are around 7-10 days, 2-3 weeks, 4-6 " "weeks, 3 months, 4 months, 6 months and 9 months, but these vary widely between babies.  During these times allow your baby to nurse very frequently (or pump more frequently) to temporarily boost your supply, as opposed to supplementing.  It should pass in a few days when your supply increases, and your baby will settle into a new feeding pattern.    How to get a breastfeeding test weight scale:     Rental (2ml sensitivity):   o [employer] ([facility]) [phone]   o [employer] [facility]) [phone]  o [employer]) [phone]     Purchase scale (6ml sensitivity):   o \"Gill Baby Scale\" (Playroll, Amazon, etc), around $150              LACTATION SUPPORT    [place] Lactation Resources:   Bonnie Pike, WES, CNM, IBCLC  Tuesday:  [facility],  8:30 - 5:00,  [phone]  Wednesday:  [facility], 7th floor, 8:30 - 4:00, [phone]  Thursday:  [facility], [facility], 8:30 - 4:00,  [phone]    Breastfeeding Connection at [facility]  [phone]   Breastfeeding Connection at [facility]   [phone]  [facility]    [phone]  [facility]      [phone]  [facility]      [phone]  [facility]      [phone]    [facility]       [phone]  [facility]       [phone]      Other Lactation Help:    [facility] [place]/ [facility] (Tues/Wed)     o [phone]    Jake Baby Weigh In (various times and locations)    o www.VendorStack.Passport Systems ++HAS VIRTUAL SUPPORT++     Enlightened Mama   o www.FAGUOenedmamaMICROrganic Technologies [phone]    Everyday Miracles         o https://www.everyday-miracles.org/    [facility]     o [phone] ++HAS VIRTUAL SUPPORT++     Clementine Juarez DO, MPH, ABOIM, IBCLC  o Integrative Family Medicine Physician/Breastfeeding Medicine/ Home " "visits  o www.InStitchu  [phone]    [facility] \"Well Fed\" postpartum group ([facility])   o [phone]    Support in other languages:  o Italian:  - Geeta (IBCLC/ Italian) [phone]  [email or web address].[facility].  - La Leche League: Si quieres ayuda en espanol con patricia pecho por favor llmaia Meyer al [phone].  - Roberto Para Josefina - [place] & [place]    For more information call [facility] [phone] or Deja at [phone] ([place]) or Mariam Cortez at [phone] ([place]).    [place]: Fridays, 10:30 am to noon. [facility] [place] [place] [address]: Wednesdays, 1:00-2:00 PM. [facility], [address] [place] [address]  o Zodelma (Hmong) [phone]  o Sae (Citizen of [place]) [phone]     breastfeeding support:  o [facility] ([place])     - www.[facility] Hospital[facility] Commerce  [phone]    Chocolate Milk Club:    o http://www.CEGA InnovationsmidOctmami.SOAK (Smart Operational Agricultural toolKit)/chocolate-milk-club/  o Dr. Ashley Manzano, [phone]    DIVA Moms (Dynamic Involved Valued  Moms)     [phone]    WaveCheck Birthworks  o [phone] or 99 Fahrenheitvahbirthworks@[employer].SOAK (Smart Operational Agricultural toolKit)    https://www.IntelliFlo.com/Blackfamiliesdobreastfeed    [place]mong Breastfeeding Coalition:  o See Facebook site  o [email or web address] Angeli Brower [phone]    [place] [facility] [place]      o See Facebook site or Google \"Nitamisinzuly Crowdershkikinaan\"  o [email or web address] (Smart Operational Agricultural toolKit)  Antonio Church [phone]   o [email or web address] [email or web address]         Telephone and Online Support      [facility] ++HAS VIRTUAL SUPPORT++ (call for eligibility information)   [phone]      BabyCafes (www.babycafeusa.org) (now in person)      La Leche League International   ++HAS VIRTUAL SUPPORT++  www.llli.org  7-323-5-LA-LECHE ([phone])  o Local referral line [phone]  o Si quieres ayuda en espanol con " patricia hernández marie yeager [phone].      KellyMom-- up to date lactation information  o Www.Espressi.CorCardia      International Breastfeeding [place] (Shree Barth)  o Http://ibconline.ca/      The InfantRisk Call Center is available to answer questions about the use of medications during pregnancy and while breastfeeding  o [phone]  www.infantNevro.CorCardia       Office on Women's Health National Breastfeeding Help Line  o 8am to 5pm, English and Kyrgyz [phone] option 1    o https://www.womenshealth.gov/breastfeeding/ Jjek4Mbfm Meme (free on Framed Data meme store or Google Play)      LactMed Meme (free on Framed Data meme store or Google Play) LactMed is available online at https://toxnet.nlm.nih.gov/newtoxnet/lactmed.htm and is now available on your mobile device. The free LactMed Meme for iPhone/Magick.nu Touch and Android can be downloaded at http://toxnet.nlm.nih.gov/help/lactmedapp.htm.

## 2023-08-28 ENCOUNTER — HOSPITAL ENCOUNTER (EMERGENCY)
Facility: CLINIC | Age: 38
Discharge: HOME | End: 2023-08-28
Attending: STUDENT IN AN ORGANIZED HEALTH CARE EDUCATION/TRAINING PROGRAM | Admitting: STUDENT IN AN ORGANIZED HEALTH CARE EDUCATION/TRAINING PROGRAM
Payer: COMMERCIAL

## 2023-08-28 ENCOUNTER — APPOINTMENT (OUTPATIENT)
Dept: CT IMAGING | Facility: CLINIC | Age: 38
End: 2023-08-28
Attending: STUDENT IN AN ORGANIZED HEALTH CARE EDUCATION/TRAINING PROGRAM
Payer: COMMERCIAL

## 2023-08-28 VITALS
OXYGEN SATURATION: 99 % | RESPIRATION RATE: 18 BRPM | WEIGHT: 193 LBS | HEIGHT: 65 IN | HEART RATE: 86 BPM | TEMPERATURE: 98.6 F | BODY MASS INDEX: 32.15 KG/M2 | SYSTOLIC BLOOD PRESSURE: 113 MMHG | DIASTOLIC BLOOD PRESSURE: 64 MMHG

## 2023-08-28 DIAGNOSIS — N23 RENAL COLIC ON LEFT SIDE: ICD-10-CM

## 2023-08-28 LAB
ALBUMIN UR-MCNC: NEGATIVE MG/DL
ANION GAP SERPL CALCULATED.3IONS-SCNC: 5 MMOL/L (ref 5–18)
APPEARANCE UR: CLEAR
BASOPHILS # BLD AUTO: 0.1 10E3/UL (ref 0–0.2)
BASOPHILS NFR BLD AUTO: 1 %
BILIRUB UR QL STRIP: NEGATIVE
BUN SERPL-MCNC: 19 MG/DL (ref 8–22)
CALCIUM SERPL-MCNC: 9.1 MG/DL (ref 8.5–10.5)
CHLORIDE BLD-SCNC: 107 MMOL/L (ref 98–107)
CO2 SERPL-SCNC: 26 MMOL/L (ref 22–31)
COLOR UR AUTO: YELLOW
CREAT SERPL-MCNC: 0.74 MG/DL (ref 0.6–1.1)
EOSINOPHIL # BLD AUTO: 0.2 10E3/UL (ref 0–0.7)
EOSINOPHIL NFR BLD AUTO: 3 %
ERYTHROCYTE [DISTWIDTH] IN BLOOD BY AUTOMATED COUNT: 13.4 % (ref 10–15)
GFR SERPL CREATININE-BSD FRML MDRD: >90 ML/MIN/1.73M2
GLUCOSE BLD-MCNC: 86 MG/DL (ref 70–125)
GLUCOSE UR STRIP-MCNC: NEGATIVE MG/DL
HCG UR QL: NEGATIVE
HCT VFR BLD AUTO: 40.2 % (ref 35–47)
HGB BLD-MCNC: 13.2 G/DL (ref 11.7–15.7)
HGB UR QL STRIP: ABNORMAL
IMM GRANULOCYTES # BLD: 0 10E3/UL
IMM GRANULOCYTES NFR BLD: 0 %
KETONES UR STRIP-MCNC: NEGATIVE MG/DL
LEUKOCYTE ESTERASE UR QL STRIP: ABNORMAL
LYMPHOCYTES # BLD AUTO: 2.9 10E3/UL (ref 0.8–5.3)
LYMPHOCYTES NFR BLD AUTO: 36 %
MCH RBC QN AUTO: 26.7 PG (ref 26.5–33)
MCHC RBC AUTO-ENTMCNC: 32.8 G/DL (ref 31.5–36.5)
MCV RBC AUTO: 81 FL (ref 78–100)
MONOCYTES # BLD AUTO: 0.9 10E3/UL (ref 0–1.3)
MONOCYTES NFR BLD AUTO: 11 %
MUCOUS THREADS #/AREA URNS LPF: PRESENT /LPF
NEUTROPHILS # BLD AUTO: 3.9 10E3/UL (ref 1.6–8.3)
NEUTROPHILS NFR BLD AUTO: 49 %
NITRATE UR QL: NEGATIVE
NRBC # BLD AUTO: 0 10E3/UL
NRBC BLD AUTO-RTO: 0 /100
PH UR STRIP: 6 [PH] (ref 5–7)
PLATELET # BLD AUTO: 303 10E3/UL (ref 150–450)
POTASSIUM BLD-SCNC: 4.5 MMOL/L (ref 3.5–5)
RBC # BLD AUTO: 4.95 10E6/UL (ref 3.8–5.2)
RBC URINE: 75 /HPF
SODIUM SERPL-SCNC: 138 MMOL/L (ref 136–145)
SP GR UR STRIP: 1.02 (ref 1–1.03)
SQUAMOUS EPITHELIAL: 2 /HPF
UROBILINOGEN UR STRIP-MCNC: <2 MG/DL
WBC # BLD AUTO: 7.9 10E3/UL (ref 4–11)
WBC URINE: 3 /HPF

## 2023-08-28 PROCEDURE — 85025 COMPLETE CBC W/AUTO DIFF WBC: CPT | Performed by: STUDENT IN AN ORGANIZED HEALTH CARE EDUCATION/TRAINING PROGRAM

## 2023-08-28 PROCEDURE — 96361 HYDRATE IV INFUSION ADD-ON: CPT

## 2023-08-28 PROCEDURE — 81001 URINALYSIS AUTO W/SCOPE: CPT | Performed by: STUDENT IN AN ORGANIZED HEALTH CARE EDUCATION/TRAINING PROGRAM

## 2023-08-28 PROCEDURE — 96360 HYDRATION IV INFUSION INIT: CPT

## 2023-08-28 PROCEDURE — 99284 EMERGENCY DEPT VISIT MOD MDM: CPT | Mod: 25

## 2023-08-28 PROCEDURE — 81025 URINE PREGNANCY TEST: CPT | Performed by: STUDENT IN AN ORGANIZED HEALTH CARE EDUCATION/TRAINING PROGRAM

## 2023-08-28 PROCEDURE — 74176 CT ABD & PELVIS W/O CONTRAST: CPT

## 2023-08-28 PROCEDURE — 258N000003 HC RX IP 258 OP 636: Performed by: STUDENT IN AN ORGANIZED HEALTH CARE EDUCATION/TRAINING PROGRAM

## 2023-08-28 PROCEDURE — 36415 COLL VENOUS BLD VENIPUNCTURE: CPT | Performed by: STUDENT IN AN ORGANIZED HEALTH CARE EDUCATION/TRAINING PROGRAM

## 2023-08-28 PROCEDURE — 80048 BASIC METABOLIC PNL TOTAL CA: CPT | Performed by: STUDENT IN AN ORGANIZED HEALTH CARE EDUCATION/TRAINING PROGRAM

## 2023-08-28 RX ORDER — OXYCODONE HYDROCHLORIDE 5 MG/1
5 TABLET ORAL EVERY 6 HOURS PRN
Qty: 12 TABLET | Refills: 0 | Status: SHIPPED | OUTPATIENT
Start: 2023-08-28 | End: 2023-08-31

## 2023-08-28 RX ADMIN — SODIUM CHLORIDE, POTASSIUM CHLORIDE, SODIUM LACTATE AND CALCIUM CHLORIDE 1000 ML: 600; 310; 30; 20 INJECTION, SOLUTION INTRAVENOUS at 09:34

## 2023-08-28 ASSESSMENT — ENCOUNTER SYMPTOMS
COUGH: 0
HEMATURIA: 0
ABDOMINAL PAIN: 1
VOMITING: 0
SHORTNESS OF BREATH: 0
DIARRHEA: 0
CONSTIPATION: 0
BLOOD IN STOOL: 0
DYSURIA: 0
FEVER: 0
NAUSEA: 0

## 2023-08-28 ASSESSMENT — ACTIVITIES OF DAILY LIVING (ADL)
ADLS_ACUITY_SCORE: 33
ADLS_ACUITY_SCORE: 35

## 2023-08-28 NOTE — ED TRIAGE NOTES
Patient presents to ED with urgency with urination that began @0600 this morning, patient reports that it feels like the last time that she had a kidney stone, also had some pain that started in lower abdomen and wraps around L side into lower L back, took Tylenol, Ibuprofen and left over Oxy from last stone.  Brielle Benitez RN.......8/28/2023 8:45 AM     Triage Assessment       Row Name 08/28/23 0816       Triage Assessment (Adult)    Airway WDL WDL       Respiratory WDL    Respiratory WDL WDL       Skin Circulation/Temperature WDL    Skin Circulation/Temperature WDL WDL       Cardiac WDL    Cardiac WDL WDL       Peripheral/Neurovascular WDL    Peripheral Neurovascular WDL WDL       Cognitive/Neuro/Behavioral WDL    Cognitive/Neuro/Behavioral WDL WDL

## 2023-08-28 NOTE — ED PROVIDER NOTES
EMERGENCY DEPARTMENT ENCOUNTER      NAME: Clementine Noel  AGE: 38 year old female  YOB: 1985  MRN: [id number]  EVALUATION DATE & TIME: 8/28/2023  8:47 AM    PCP: No Ref-Primary, Physician    ED PROVIDER: Glynn Gar MD      Chief Complaint   Patient presents with    Abdominal Pain    Back Pain         FINAL IMPRESSION:  1. Renal colic on left side          ED COURSE & MEDICAL DECISION MAKING:    Pertinent Labs & Imaging studies reviewed. (See chart for details)  38 year old female presents to the Emergency Department for evaluation of flank pain    ED Course as of 08/28/23 1547   Mon Aug 28, 2023   0902 Patient is a 38-year-old female with a past medical history significant for kidney stones, who presents the emergency department with left lower quadrant abdominal pain radiating into her left flank in the absence of fevers or CVA tenderness or peritonitis on exam.  Patient states she has not been sexually active since having her last child in May of this year, and denies vaginal symptoms, making vaginal pathology or ectopic much less likely.  Will ensure this with a pregnancy test.  Differential diagnosis includes kidney stone versus UTI.  Will evaluate with urinalysis and a CT Noncon.  Will check on kidney function as well with a BMP.   0946 No leukocytosis or anemia.  No electrolyte normalities or kidney injury.   1033 Urine pregnancy negative.  Patient's urine has RBCs, mucus, leuk esterase in the absence of nitrites.  I suspect that this is inflammation from a likely kidney stone that will be seen on CT scan (pending at this time).  Not indicative of infection, and will not treat as such currently.       Medical Decision Making    History:  Supplemental history from: N/A  External Record(s) reviewed: Documented in chart, if applicable.    Work Up:  Chart documentation includes differential considered and any EKGs or imaging independently interpreted by provider, where specified.  In  additional to work up documented, I considered the following work up: Documented in chart, if applicable.    External consultation:  Discussion of management with another provider: Documented in chart, if applicable    Complicating factors:  Care impacted by chronic illness: N/A  Care affected by social determinants of health: N/A    Disposition considerations: Discharge. I prescribed additional prescription strength medication(s) as charted. See documentation for any additional details.    8:53 AM Met with patient for initial interview and exam. Discussed initial plan for care for their stay in the emergency department.  11:04 AM Updated patient on results. Discussed and reviewed discharge plans, answered all questions. The patient agreed to the plan for discharge. Return precautions discussed.    At the conclusion of the encounter I discussed the results of all of the tests and the disposition. The questions were answered. The patient or family acknowledged understanding and was agreeable with the care plan.     0 minutes of critical care time     MEDICATIONS GIVEN IN THE EMERGENCY:  Medications   lactated ringers BOLUS 1,000 mL (0 mLs Intravenous Stopped 8/28/23 1106)       NEW PRESCRIPTIONS STARTED AT TODAY'S ER VISIT  Discharge Medication List as of 8/28/2023 11:06 AM        START taking these medications    Details   oxyCODONE (ROXICODONE) 5 MG tablet Take 1 tablet (5 mg) by mouth every 6 hours as needed for pain, Disp-12 tablet, R-0, Local Print                =================================================================    HPI    Patient information was obtained from: patient    Use of : N/A       Clementine Noel is a 38 year old female with a pertinent history of anxiety who presents to this ED via private vehicle for evaluation of abdominal pain    This morning the patient woke up with the gradual onset of progressively worsening constant urinary urgency and left lower quadrant abdominal  pain radiating to her left flank. She states that she passed a bowel movement and urinated earlier this morning but that the urinary urgency persisted and remained constant.      The patient reports that she took a dose of dramamine, tylenol, and ibuprofen. In addition, she notes that she took a dose of a 5 mg oxycodone that she had left over from her last kidney stone. The patient states that she felt relief as soon as she arrived to the ED but is unsure if she passed a kidney stone or if the medications are providing her temporary relief.     The patient reports a history of one previous kidney stone several years ago. She notes that her current symptoms feel similar to her previous kidney stone.     She denies any other known medical problems or daily medications. The patient denies any chance of being pregnant. She denies any previous abdominal surgeries. The patient denies any hematuria, urinary frequency, fevers, chest pain, shortness of breath, nausea, vomiting, diarrhea, constipation, black or bloody stools, vaginal bleeding or discharge, cough, congestion, or any other complaints.     REVIEW OF SYSTEMS   Review of Systems   Constitutional:  Negative for fever.   HENT:  Negative for congestion.    Respiratory:  Negative for cough and shortness of breath.    Cardiovascular:  Negative for chest pain.   Gastrointestinal:  Positive for abdominal pain (lower left radiating to left flank). Negative for blood in stool, constipation, diarrhea, nausea and vomiting.   Genitourinary:  Positive for urgency. Negative for dysuria, hematuria, vaginal bleeding and vaginal discharge.        PAST MEDICAL HISTORY:  No past medical history on file.    PAST SURGICAL HISTORY:  No past surgical history on file.        CURRENT MEDICATIONS:    oxyCODONE (ROXICODONE) 5 MG tablet  acetaminophen (TYLENOL) 325 MG tablet  cetirizine (ZYRTEC) 10 MG tablet  escitalopram (LEXAPRO) 20 MG tablet  ibuprofen (ADVIL/MOTRIN) 600 MG  "tablet  Prenatal Vit-Fe Fumarate-FA (PRENATAL MULTIVITAMIN W/IRON) 27-0.8 MG tablet  senna-docusate (SENOKOT-S/PERICOLACE) 8.6-50 MG tablet        ALLERGIES:  Allergies   Allergen Reactions    Penicillins Rash     Rash was as a child. States has taken augmentin without problem.       FAMILY HISTORY:  No family history on file.    SOCIAL HISTORY:   Social History     Socioeconomic History    Marital status:        VITALS:  /64   Pulse 86   Temp 98.6  F (37  C) (Oral)   Resp 18   Ht 1.651 m (5' 5\")   Wt 87.5 kg (193 lb)   SpO2 99%   Breastfeeding Yes   BMI 32.12 kg/m      PHYSICAL EXAM    Physical Exam  Vitals and nursing note reviewed.   Constitutional:       General: She is not in acute distress.     Appearance: Normal appearance. She is well-developed.   HENT:      Head: Normocephalic and atraumatic.      Nose: Nose normal.      Mouth/Throat:      Mouth: Mucous membranes are moist.      Pharynx: Oropharynx is clear.   Eyes:      General: No scleral icterus.     Conjunctiva/sclera: Conjunctivae normal.      Pupils: Pupils are equal, round, and reactive to light.   Cardiovascular:      Rate and Rhythm: Normal rate and regular rhythm.      Pulses: Normal pulses.      Heart sounds: Normal heart sounds. No murmur heard.  Pulmonary:      Effort: Pulmonary effort is normal. No respiratory distress.      Breath sounds: Normal breath sounds.   Abdominal:      General: Abdomen is flat. There is no distension.      Palpations: Abdomen is soft.      Tenderness: There is no abdominal tenderness. There is no right CVA tenderness or left CVA tenderness.   Musculoskeletal:         General: Normal range of motion.      Cervical back: Normal range of motion and neck supple.   Skin:     General: Skin is warm and dry.      Capillary Refill: Capillary refill takes less than 2 seconds.      Findings: No rash.   Neurological:      General: No focal deficit present.      Mental Status: She is alert and oriented to " person, place, and time. Mental status is at baseline.   Psychiatric:         Mood and Affect: Mood normal.         Behavior: Behavior normal.         Thought Content: Thought content normal.         Judgment: Judgment normal.            LAB:  All pertinent labs reviewed and interpreted.  Results for orders placed or performed during the hospital encounter of 08/28/23   CT Abdomen Pelvis w/o Contrast    Impression    IMPRESSION:   1.  Three stones are noted within the inferior pole of the right kidney measuring 3 to 4 mm. 2 mm stone noted in the inferior pole of the right kidney. No hydronephrosis. No stones identified within the ureters or bladder.     Basic metabolic panel   Result Value Ref Range    Sodium 138 136 - 145 mmol/L    Potassium 4.5 3.5 - 5.0 mmol/L    Chloride 107 98 - 107 mmol/L    Carbon Dioxide (CO2) 26 22 - 31 mmol/L    Anion Gap 5 5 - 18 mmol/L    Urea Nitrogen 19 8 - 22 mg/dL    Creatinine 0.74 0.60 - 1.10 mg/dL    Calcium 9.1 8.5 - 10.5 mg/dL    Glucose 86 70 - 125 mg/dL    GFR Estimate >90 >60 mL/min/1.73m2   UA with Microscopic reflex to Culture    Specimen: Urine, Clean Catch   Result Value Ref Range    Color Urine Yellow Colorless, Straw, Light Yellow, Yellow    Appearance Urine Clear Clear    Glucose Urine Negative Negative mg/dL    Bilirubin Urine Negative Negative    Ketones Urine Negative Negative mg/dL    Specific Gravity Urine 1.024 1.001 - 1.030    Blood Urine 0.5 mg/dL (A) Negative    pH Urine 6.0 5.0 - 7.0    Protein Albumin Urine Negative Negative mg/dL    Urobilinogen Urine <2.0 <2.0 mg/dL    Nitrite Urine Negative Negative    Leukocyte Esterase Urine 75 El/uL (A) Negative    Mucus Urine Present (A) None Seen /LPF    RBC Urine 75 (H) <=2 /HPF    WBC Urine 3 <=5 /HPF    Squamous Epithelials Urine 2 (H) <=1 /HPF   HCG qualitative urine   Result Value Ref Range    hCG Urine Qualitative Negative Negative   CBC with platelets and differential   Result Value Ref Range    WBC Count 7.9  4.0 - 11.0 10e3/uL    RBC Count 4.95 3.80 - 5.20 10e6/uL    Hemoglobin 13.2 11.7 - 15.7 g/dL    Hematocrit 40.2 35.0 - 47.0 %    MCV 81 78 - 100 fL    MCH 26.7 26.5 - 33.0 pg    MCHC 32.8 31.5 - 36.5 g/dL    RDW 13.4 10.0 - 15.0 %    Platelet Count 303 150 - 450 10e3/uL    % Neutrophils 49 %    % Lymphocytes 36 %    % Monocytes 11 %    % Eosinophils 3 %    % Basophils 1 %    % Immature Granulocytes 0 %    NRBCs per 100 WBC 0 <1 /100    Absolute Neutrophils 3.9 1.6 - 8.3 10e3/uL    Absolute Lymphocytes 2.9 0.8 - 5.3 10e3/uL    Absolute Monocytes 0.9 0.0 - 1.3 10e3/uL    Absolute Eosinophils 0.2 0.0 - 0.7 10e3/uL    Absolute Basophils 0.1 0.0 - 0.2 10e3/uL    Absolute Immature Granulocytes 0.0 <=0.4 10e3/uL    Absolute NRBCs 0.0 10e3/uL       RADIOLOGY:  Reviewed all pertinent imaging. Please see official radiology report.  CT Abdomen Pelvis w/o Contrast   Final Result   IMPRESSION:    1.  Three stones are noted within the inferior pole of the right kidney measuring 3 to 4 mm. 2 mm stone noted in the inferior pole of the right kidney. No hydronephrosis. No stones identified within the ureters or bladder.             EKG:    None    PROCEDURES:   None      [facility] Documentation:   CMS Diagnoses:               I, Bárbara Tabares, am serving as a scribe to document services personally performed by Glynn Gar MD based on my observation and the provider's statements to me. I, Glynn Gar MD, attest that Bárbara Tabares is acting in a scribe capacity, has observed my performance of the services and has documented them in accordance with my direction.    Glynn Gar MD  [facility] EMERGENCY ROOM  [address] [facility] [address] [phone]       Glynn Gar MD  08/28/23 1088

## 2023-08-28 NOTE — DISCHARGE INSTRUCTIONS
You have evidence of several small stones in both kidneys, but predominantly on the left side.  Nothing that is seen in areas that would be causing pain currently, so you may have passed a stone earlier, which explains your resolution of symptoms.  We will prescribe you medications to help with passage of stones if your symptoms return later.  You should however follow-up with your primary care physician within the next week to ensure that your symptoms are improving or resolved.    Please return to the emergency department if you develop fever, worsening pain, inability to urinate.

## 2024-03-03 ENCOUNTER — HEALTH MAINTENANCE LETTER (OUTPATIENT)
Age: 39
End: 2024-03-03

## 2024-10-20 ENCOUNTER — APPOINTMENT (OUTPATIENT)
Dept: CT IMAGING | Facility: CLINIC | Age: 39
End: 2024-10-20
Attending: EMERGENCY MEDICINE
Payer: COMMERCIAL

## 2024-10-20 ENCOUNTER — HOSPITAL ENCOUNTER (EMERGENCY)
Facility: CLINIC | Age: 39
Discharge: HOME | End: 2024-10-20
Attending: EMERGENCY MEDICINE | Admitting: EMERGENCY MEDICINE
Payer: COMMERCIAL

## 2024-10-20 VITALS
OXYGEN SATURATION: 98 % | WEIGHT: 185 LBS | HEART RATE: 90 BPM | RESPIRATION RATE: 16 BRPM | BODY MASS INDEX: 30.82 KG/M2 | SYSTOLIC BLOOD PRESSURE: 114 MMHG | HEIGHT: 65 IN | TEMPERATURE: 98.7 F | DIASTOLIC BLOOD PRESSURE: 65 MMHG

## 2024-10-20 DIAGNOSIS — N20.1 URETEROLITHIASIS: ICD-10-CM

## 2024-10-20 LAB
ALBUMIN SERPL BCG-MCNC: 4.1 G/DL (ref 3.5–5.2)
ALBUMIN UR-MCNC: 30 MG/DL
ALP SERPL-CCNC: 56 U/L (ref 40–150)
ALT SERPL W P-5'-P-CCNC: 11 U/L (ref 0–50)
ANION GAP SERPL CALCULATED.3IONS-SCNC: 14 MMOL/L (ref 7–15)
APPEARANCE UR: ABNORMAL
AST SERPL W P-5'-P-CCNC: 15 U/L (ref 0–45)
BACTERIA #/AREA URNS HPF: ABNORMAL /HPF
BASOPHILS # BLD AUTO: 0.1 10E3/UL (ref 0–0.2)
BASOPHILS NFR BLD AUTO: 1 %
BILIRUB SERPL-MCNC: 1 MG/DL
BILIRUB UR QL STRIP: NEGATIVE
BUN SERPL-MCNC: 13.3 MG/DL (ref 6–20)
CALCIUM SERPL-MCNC: 8.6 MG/DL (ref 8.8–10.4)
CHLORIDE SERPL-SCNC: 103 MMOL/L (ref 98–107)
COLOR UR AUTO: YELLOW
CREAT SERPL-MCNC: 0.77 MG/DL (ref 0.51–0.95)
CRP SERPL-MCNC: <3 MG/L
EGFRCR SERPLBLD CKD-EPI 2021: >90 ML/MIN/1.73M2
EOSINOPHIL # BLD AUTO: 0.4 10E3/UL (ref 0–0.7)
EOSINOPHIL NFR BLD AUTO: 3 %
ERYTHROCYTE [DISTWIDTH] IN BLOOD BY AUTOMATED COUNT: 12.3 % (ref 10–15)
GLUCOSE SERPL-MCNC: 130 MG/DL (ref 70–99)
GLUCOSE UR STRIP-MCNC: NEGATIVE MG/DL
HCG UR QL: NEGATIVE
HCO3 SERPL-SCNC: 21 MMOL/L (ref 22–29)
HCT VFR BLD AUTO: 40.3 % (ref 35–47)
HGB BLD-MCNC: 13.4 G/DL (ref 11.7–15.7)
HGB UR QL STRIP: ABNORMAL
IMM GRANULOCYTES # BLD: 0 10E3/UL
IMM GRANULOCYTES NFR BLD: 0 %
KETONES UR STRIP-MCNC: 5 MG/DL
LEUKOCYTE ESTERASE UR QL STRIP: ABNORMAL
LIPASE SERPL-CCNC: 20 U/L (ref 13–60)
LYMPHOCYTES # BLD AUTO: 3.6 10E3/UL (ref 0.8–5.3)
LYMPHOCYTES NFR BLD AUTO: 30 %
MCH RBC QN AUTO: 26.9 PG (ref 26.5–33)
MCHC RBC AUTO-ENTMCNC: 33.3 G/DL (ref 31.5–36.5)
MCV RBC AUTO: 81 FL (ref 78–100)
MONOCYTES # BLD AUTO: 1.3 10E3/UL (ref 0–1.3)
MONOCYTES NFR BLD AUTO: 11 %
MUCOUS THREADS #/AREA URNS LPF: PRESENT /LPF
NEUTROPHILS # BLD AUTO: 6.6 10E3/UL (ref 1.6–8.3)
NEUTROPHILS NFR BLD AUTO: 56 %
NITRATE UR QL: NEGATIVE
NRBC # BLD AUTO: 0 10E3/UL
NRBC BLD AUTO-RTO: 0 /100
PH UR STRIP: 6 [PH] (ref 5–7)
PLATELET # BLD AUTO: 347 10E3/UL (ref 150–450)
POTASSIUM SERPL-SCNC: 4 MMOL/L (ref 3.4–5.3)
PROT SERPL-MCNC: 6.6 G/DL (ref 6.4–8.3)
RBC # BLD AUTO: 4.98 10E6/UL (ref 3.8–5.2)
RBC URINE: 47 /HPF
SODIUM SERPL-SCNC: 138 MMOL/L (ref 135–145)
SP GR UR STRIP: >1.03 (ref 1–1.03)
SQUAMOUS EPITHELIAL: 23 /HPF
UROBILINOGEN UR STRIP-MCNC: <2 MG/DL
WBC # BLD AUTO: 12 10E3/UL (ref 4–11)
WBC URINE: 13 /HPF

## 2024-10-20 PROCEDURE — 86140 C-REACTIVE PROTEIN: CPT | Performed by: EMERGENCY MEDICINE

## 2024-10-20 PROCEDURE — 250N000013 HC RX MED GY IP 250 OP 250 PS 637: Performed by: EMERGENCY MEDICINE

## 2024-10-20 PROCEDURE — 96375 TX/PRO/DX INJ NEW DRUG ADDON: CPT

## 2024-10-20 PROCEDURE — 96374 THER/PROPH/DIAG INJ IV PUSH: CPT

## 2024-10-20 PROCEDURE — 81001 URINALYSIS AUTO W/SCOPE: CPT | Performed by: EMERGENCY MEDICINE

## 2024-10-20 PROCEDURE — 83690 ASSAY OF LIPASE: CPT | Performed by: EMERGENCY MEDICINE

## 2024-10-20 PROCEDURE — 81025 URINE PREGNANCY TEST: CPT | Performed by: EMERGENCY MEDICINE

## 2024-10-20 PROCEDURE — 85004 AUTOMATED DIFF WBC COUNT: CPT | Performed by: EMERGENCY MEDICINE

## 2024-10-20 PROCEDURE — 74176 CT ABD & PELVIS W/O CONTRAST: CPT

## 2024-10-20 PROCEDURE — 36415 COLL VENOUS BLD VENIPUNCTURE: CPT | Performed by: EMERGENCY MEDICINE

## 2024-10-20 PROCEDURE — 80053 COMPREHEN METABOLIC PANEL: CPT | Performed by: EMERGENCY MEDICINE

## 2024-10-20 PROCEDURE — 99285 EMERGENCY DEPT VISIT HI MDM: CPT | Mod: 25

## 2024-10-20 PROCEDURE — 87086 URINE CULTURE/COLONY COUNT: CPT | Performed by: EMERGENCY MEDICINE

## 2024-10-20 PROCEDURE — 250N000011 HC RX IP 250 OP 636: Performed by: EMERGENCY MEDICINE

## 2024-10-20 RX ORDER — OXYCODONE HYDROCHLORIDE 5 MG/1
5 TABLET ORAL EVERY 4 HOURS PRN
Qty: 12 TABLET | Refills: 0 | Status: SHIPPED | OUTPATIENT
Start: 2024-10-20 | End: 2024-10-24

## 2024-10-20 RX ORDER — ACETAMINOPHEN 500 MG
1000 TABLET ORAL EVERY 6 HOURS
Qty: 56 TABLET | Refills: 0 | Status: SHIPPED | OUTPATIENT
Start: 2024-10-20 | End: 2024-10-27

## 2024-10-20 RX ORDER — IBUPROFEN 200 MG
400 TABLET ORAL EVERY 8 HOURS PRN
Qty: 28 TABLET | Refills: 0 | Status: SHIPPED | OUTPATIENT
Start: 2024-10-20 | End: 2024-10-27

## 2024-10-20 RX ORDER — ACETAMINOPHEN 325 MG/1
975 TABLET ORAL ONCE
Status: COMPLETED | OUTPATIENT
Start: 2024-10-20 | End: 2024-10-20

## 2024-10-20 RX ORDER — DIMENHYDRINATE 50 MG
50 TABLET ORAL EVERY 6 HOURS PRN
Qty: 28 TABLET | Refills: 0 | Status: SHIPPED | OUTPATIENT
Start: 2024-10-20 | End: 2024-10-27

## 2024-10-20 RX ORDER — KETOROLAC TROMETHAMINE 15 MG/ML
15 INJECTION, SOLUTION INTRAMUSCULAR; INTRAVENOUS ONCE
Status: COMPLETED | OUTPATIENT
Start: 2024-10-20 | End: 2024-10-20

## 2024-10-20 RX ORDER — ONDANSETRON 2 MG/ML
4 INJECTION INTRAMUSCULAR; INTRAVENOUS EVERY 30 MIN PRN
Status: DISCONTINUED | OUTPATIENT
Start: 2024-10-20 | End: 2024-10-20 | Stop reason: HOSPADM

## 2024-10-20 RX ORDER — OXYCODONE HYDROCHLORIDE 5 MG/1
10 TABLET ORAL ONCE
Status: COMPLETED | OUTPATIENT
Start: 2024-10-20 | End: 2024-10-20

## 2024-10-20 RX ADMIN — OXYCODONE 10 MG: 5 TABLET ORAL at 09:46

## 2024-10-20 RX ADMIN — ACETAMINOPHEN 975 MG: 325 TABLET ORAL at 07:48

## 2024-10-20 RX ADMIN — Medication 50 MG: at 07:49

## 2024-10-20 RX ADMIN — HYDROMORPHONE HYDROCHLORIDE 1 MG: 1 INJECTION, SOLUTION INTRAMUSCULAR; INTRAVENOUS; SUBCUTANEOUS at 08:53

## 2024-10-20 RX ADMIN — KETOROLAC TROMETHAMINE 15 MG: 15 INJECTION, SOLUTION INTRAMUSCULAR; INTRAVENOUS at 07:49

## 2024-10-20 ASSESSMENT — ACTIVITIES OF DAILY LIVING (ADL)
ADLS_ACUITY_SCORE: 35

## 2024-10-20 ASSESSMENT — COLUMBIA-SUICIDE SEVERITY RATING SCALE - C-SSRS
6. HAVE YOU EVER DONE ANYTHING, STARTED TO DO ANYTHING, OR PREPARED TO DO ANYTHING TO END YOUR LIFE?: NO
1. IN THE PAST MONTH, HAVE YOU WISHED YOU WERE DEAD OR WISHED YOU COULD GO TO SLEEP AND NOT WAKE UP?: NO
2. HAVE YOU ACTUALLY HAD ANY THOUGHTS OF KILLING YOURSELF IN THE PAST MONTH?: NO

## 2024-10-20 ASSESSMENT — ENCOUNTER SYMPTOMS
DIFFICULTY URINATING: 1
FLANK PAIN: 1
HEMATURIA: 1
FEVER: 0
NAUSEA: 1

## 2024-10-20 NOTE — ED TRIAGE NOTES
The patient presents to the ED with left lower abdominal pain that began overnight. The patient reports a history of kidney stones in the past. She reports hematuria and the feeling of being unable to empty her bladder/dysuria. The patient denies fever. The patient took 5 mg of oxycodone this morning and ibuprofen that she states was not very helpful in treating her pain. She denies nausea/vomiting.

## 2024-10-20 NOTE — ED PROVIDER NOTES
EMERGENCY DEPARTMENT ENCOUNTER       ED Course & Medical Decision Making   7:29 AM I met the patient and performed my initial interview and exam. Discussed workup in the emergency department, management of symptoms, and likely disposition.      I saw and examined the patient.  Given her hematuria, history of stones with similar presentation this would be highest on the differential diagnosis.  I will do CT without contrast.  Urinalysis shows some hematuria and some white blood cells.  Clinically she has not had any symptoms of infection.  He will be sent for culture.  CT scan shows 3 mm left UVJ stone consistent with symptoms.  She is forming stones bilaterally and she is notified of this.      She was initially treated with Tylenol, Toradol, Dramamine and Zofran.  Symptoms were not controlled with these medications alone and she was given a milligram of Dilaudid followed by some oxycodone.  She is reevaluated and her pain is controlled and she would like to try and manage this as an outpatient.  [facility] referral has been placed.  She is cautioned to watch for any signs of urinary tract infection/fever.  If symptoms are controlled she would also return.  Otherwise she will follow-up as indicated above    Medical Decision Making  Obtained supplemental history:Supplemental history obtained?: No  Reviewed external records: External records reviewed?: Documented in chart  Care impacted by chronic illness:Documented in Chart  Care significantly affected by social determinants of health:N/A  Did you consider but not order tests?: Work up considered but not performed and documented in chart, if applicable  Did you interpret images independently?: Independent interpretation of ECG and images noted in documentation, when applicable.  Consultation discussion with other provider:Did you involve another provider (consultant, MH, pharmacy, etc.)?: No  Discharge. I prescribed additional prescription strength medication(s) as charted.  See documentation for any additional details.             Prior to making a final disposition on this patient the results of patient's tests and other diagnostic studies were discussed with the patient. All questions were answered. Patient expressed understanding of the plan and was amenable to it.    Medications   ondansetron (ZOFRAN) injection 4 mg (has no administration in time range)   ketorolac (TORADOL) injection 15 mg (15 mg Intravenous $Given 10/20/24 0780)   acetaminophen (TYLENOL) tablet 975 mg (975 mg Oral $Given 10/20/24 0748)   dimenhyDRINATE chew tab 50 mg (50 mg Oral $Given 10/20/24 0749)   HYDROmorphone (DILAUDID) injection 1 mg (1 mg Intravenous $Given 10/20/24 8501)   oxyCODONE (ROXICODONE) tablet 10 mg (10 mg Oral $Given 10/20/24 0946)       Final Impression     1. Ureterolithiasis            Chief Complaint     Chief Complaint   Patient presents with    Flank Pain            HPI       Clementine Noel is a pleasant 39 year old female with a pertinent history of kidney stones who presents to the ED by walk in for evaluation of left flank pain.    The patient had intercourse with her  last night. She woke up at 1 AM from sleep with left lower abdominal and flank pain, thought that this may have been due to having intercourse, and took oxycodone and ibuprofen without relief. She fell back asleep and woke up at 6 AM with the same pain. Patient was able to urinate this morning and reported hematuria, describing an orange color to her urine. She states that this pain feels similar to her past kidney stones. In the ED, she feels the urge to urinate but is unable to empty her bladder and endorses nausea. No fevers or chest pain.    The patient denies a recent history of UTI's or any past abdominal surgeries. She takes Lexapro and is not on any other medications. She has no other concerns at this time.     Zafar CARLOS am serving as a scribe to document services personally performed by Alex  "JAN Gandhi M.D. based on my observation and the provider's statements to me. I, Alex Gandhi M.D attest that Zafar Yousif is acting in a scribe capacity, has observed my performance of the services and has documented them in accordance with my direction.    Past Medical History     History reviewed. No pertinent past medical history.  History reviewed. No pertinent surgical history.  History reviewed. No pertinent family history.        Relevant past medical, surgical, family and social history as documented above, has been reviewed and discussed with patient. No changes or additions, unless otherwise noted in the HPI.    Current Medications     acetaminophen (TYLENOL) 500 MG tablet  cetirizine (ZYRTEC) 10 MG tablet  dimenhyDRINATE (DRAMAMINE) 50 MG tablet  escitalopram (LEXAPRO) 20 MG tablet  ibuprofen (ADVIL/MOTRIN) 200 MG tablet  oxyCODONE (ROXICODONE) 5 MG tablet  Prenatal Vit-Fe Fumarate-FA (PRENATAL MULTIVITAMIN W/IRON) 27-0.8 MG tablet  senna-docusate (SENOKOT-S/PERICOLACE) 8.6-50 MG tablet        Allergies     Allergies   Allergen Reactions    Penicillins Rash     Rash was as a child. States has taken augmentin without problem.       Review of Systems     Review of Systems   Constitutional:  Negative for fever.   Cardiovascular:  Negative for chest pain.   Gastrointestinal:  Positive for nausea.   Genitourinary:  Positive for difficulty urinating, flank pain (left) and hematuria.        Remainder of systems reviewed, unless noted in HPI all others negative.    Physical Exam     /76   Pulse 70   Temp 98.7  F (37.1  C) (Oral)   Resp 16   Ht 1.651 m (5' 5\")   Wt 83.9 kg (185 lb)   SpO2 94%   BMI 30.79 kg/m      Physical Exam  Vitals and nursing note reviewed.   HENT:      Head: Normocephalic.      Nose: Nose normal.   Cardiovascular:      Rate and Rhythm: Normal rate.   Pulmonary:      Effort: Pulmonary effort is normal.   Abdominal:      Tenderness: There is no guarding or rebound. "   Neurological:      Mental Status: She is alert. Mental status is at baseline.   Psychiatric:         Mood and Affect: Mood normal.             Labs & Imaging         Labs Ordered and Resulted from Time of ED Arrival to Time of ED Departure   ROUTINE UA WITH MICROSCOPIC REFLEX TO CULTURE - Abnormal       Result Value    Color Urine Yellow      Appearance Urine Turbid (*)     Glucose Urine Negative      Bilirubin Urine Negative      Ketones Urine 5 (*)     Specific Gravity Urine >1.030 (*)     Blood Urine 1.0 mg/dL (*)     pH Urine 6.0      Protein Albumin Urine 30 (*)     Urobilinogen Urine <2.0      Nitrite Urine Negative      Leukocyte Esterase Urine 250 El/uL (*)     Bacteria Urine Few (*)     Mucus Urine Present (*)     RBC Urine 47 (*)     WBC Urine 13 (*)     Squamous Epithelials Urine 23 (*)    COMPREHENSIVE METABOLIC PANEL - Abnormal    Sodium 138      Potassium 4.0      Carbon Dioxide (CO2) 21 (*)     Anion Gap 14      Urea Nitrogen 13.3      Creatinine 0.77      GFR Estimate >90      Calcium 8.6 (*)     Chloride 103      Glucose 130 (*)     Alkaline Phosphatase 56      AST 15      ALT 11      Protein Total 6.6      Albumin 4.1      Bilirubin Total 1.0     CBC WITH PLATELETS AND DIFFERENTIAL - Abnormal    WBC Count 12.0 (*)     RBC Count 4.98      Hemoglobin 13.4      Hematocrit 40.3      MCV 81      MCH 26.9      MCHC 33.3      RDW 12.3      Platelet Count 347      % Neutrophils 56      % Lymphocytes 30      % Monocytes 11      % Eosinophils 3      % Basophils 1      % Immature Granulocytes 0      NRBCs per 100 WBC 0      Absolute Neutrophils 6.6      Absolute Lymphocytes 3.6      Absolute Monocytes 1.3      Absolute Eosinophils 0.4      Absolute Basophils 0.1      Absolute Immature Granulocytes 0.0      Absolute NRBCs 0.0     HCG QUALITATIVE URINE - Normal    hCG Urine Qualitative Negative     LIPASE - Normal    Lipase 20     CRP INFLAMMATION - Normal    CRP Inflammation <3.00     URINE CULTURE          Results for orders placed or performed during the hospital encounter of 10/20/24   CT Abdomen Pelvis w/o Contrast    Impression    IMPRESSION:   1.  Left 3 mm ureterovesical junction calculus resulting in mild hydronephrosis.  2.  Bilateral nonobstructing renal calculi.     UA with Microscopic reflex to Culture    Specimen: Urine, Midstream   Result Value Ref Range    Color Urine Yellow Colorless, Straw, Light Yellow, Yellow    Appearance Urine Turbid (A) Clear    Glucose Urine Negative Negative mg/dL    Bilirubin Urine Negative Negative    Ketones Urine 5 (A) Negative mg/dL    Specific Gravity Urine >1.030 (H) 1.005 - 1.030    Blood Urine 1.0 mg/dL (A) Negative    pH Urine 6.0 5.0 - 7.0    Protein Albumin Urine 30 (A) Negative mg/dL    Urobilinogen Urine <2.0 <2.0 mg/dL    Nitrite Urine Negative Negative    Leukocyte Esterase Urine 250 El/uL (A) Negative    Bacteria Urine Few (A) None Seen /HPF    Mucus Urine Present (A) None Seen /LPF    RBC Urine 47 (H) <=2 /HPF    WBC Urine 13 (H) <=5 /HPF    Squamous Epithelials Urine 23 (H) <=1 /HPF   HCG qualitative urine   Result Value Ref Range    hCG Urine Qualitative Negative Negative   Comprehensive metabolic panel   Result Value Ref Range    Sodium 138 135 - 145 mmol/L    Potassium 4.0 3.4 - 5.3 mmol/L    Carbon Dioxide (CO2) 21 (L) 22 - 29 mmol/L    Anion Gap 14 7 - 15 mmol/L    Urea Nitrogen 13.3 6.0 - 20.0 mg/dL    Creatinine 0.77 0.51 - 0.95 mg/dL    GFR Estimate >90 >60 mL/min/1.73m2    Calcium 8.6 (L) 8.8 - 10.4 mg/dL    Chloride 103 98 - 107 mmol/L    Glucose 130 (H) 70 - 99 mg/dL    Alkaline Phosphatase 56 40 - 150 U/L    AST 15 0 - 45 U/L    ALT 11 0 - 50 U/L    Protein Total 6.6 6.4 - 8.3 g/dL    Albumin 4.1 3.5 - 5.2 g/dL    Bilirubin Total 1.0 <=1.2 mg/dL   Result Value Ref Range    Lipase 20 13 - 60 U/L   Result Value Ref Range    CRP Inflammation <3.00 <5.00 mg/L   CBC with platelets and differential   Result Value Ref Range    WBC Count 12.0 (H)  4.0 - 11.0 10e3/uL    RBC Count 4.98 3.80 - 5.20 10e6/uL    Hemoglobin 13.4 11.7 - 15.7 g/dL    Hematocrit 40.3 35.0 - 47.0 %    MCV 81 78 - 100 fL    MCH 26.9 26.5 - 33.0 pg    MCHC 33.3 31.5 - 36.5 g/dL    RDW 12.3 10.0 - 15.0 %    Platelet Count 347 150 - 450 10e3/uL    % Neutrophils 56 %    % Lymphocytes 30 %    % Monocytes 11 %    % Eosinophils 3 %    % Basophils 1 %    % Immature Granulocytes 0 %    NRBCs per 100 WBC 0 <1 /100    Absolute Neutrophils 6.6 1.6 - 8.3 10e3/uL    Absolute Lymphocytes 3.6 0.8 - 5.3 10e3/uL    Absolute Monocytes 1.3 0.0 - 1.3 10e3/uL    Absolute Eosinophils 0.4 0.0 - 0.7 10e3/uL    Absolute Basophils 0.1 0.0 - 0.2 10e3/uL    Absolute Immature Granulocytes 0.0 <=0.4 10e3/uL    Absolute NRBCs 0.0 10e3/uL       Alex Gandhi MD  Emergency Medicine  [facility] EMERGENCY ROOM  [facility] [address] [phone]  10/20/2024         Alex Gandhi MD  10/20/24 1048

## 2024-10-21 LAB — BACTERIA UR CULT: NORMAL

## 2025-03-15 ENCOUNTER — HEALTH MAINTENANCE LETTER (OUTPATIENT)
Age: 40
End: 2025-03-15